# Patient Record
Sex: FEMALE | Race: WHITE | URBAN - METROPOLITAN AREA
[De-identification: names, ages, dates, MRNs, and addresses within clinical notes are randomized per-mention and may not be internally consistent; named-entity substitution may affect disease eponyms.]

---
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
## 2022-01-25 ENCOUNTER — TELEPHONE (OUTPATIENT)
Dept: OBGYN | Age: 22
End: 2022-01-25

## 2022-01-26 ENCOUNTER — HOSPITAL ENCOUNTER (OUTPATIENT)
Age: 22
Discharge: HOME | End: 2022-01-26
Attending: OBSTETRICS & GYNECOLOGY | Admitting: OBSTETRICS & GYNECOLOGY
Payer: MEDICAID

## 2022-01-26 VITALS
RESPIRATION RATE: 16 BRPM | SYSTOLIC BLOOD PRESSURE: 128 MMHG | OXYGEN SATURATION: 99 % | DIASTOLIC BLOOD PRESSURE: 84 MMHG | TEMPERATURE: 98.2 F | HEART RATE: 105 BPM

## 2022-01-26 LAB
-: ABNORMAL
ABO/RH: NORMAL
ABSOLUTE EOS #: <0.03 K/UL (ref 0–0.44)
ABSOLUTE IMMATURE GRANULOCYTE: 0.14 K/UL (ref 0–0.3)
ABSOLUTE LYMPH #: 1.58 K/UL (ref 1.1–3.7)
ABSOLUTE MONO #: 1.07 K/UL (ref 0.1–1.4)
ALBUMIN SERPL-MCNC: 3.5 G/DL (ref 3.5–5.2)
ALBUMIN/GLOBULIN RATIO: 1.1 (ref 1–2.5)
ALP BLD-CCNC: 106 U/L (ref 35–104)
ALT SERPL-CCNC: 13 U/L (ref 5–33)
AMORPHOUS: ABNORMAL
ANION GAP SERPL CALCULATED.3IONS-SCNC: 11 MMOL/L (ref 9–17)
ANTIBODY SCREEN: NEGATIVE
ARM BAND NUMBER: NORMAL
AST SERPL-CCNC: 19 U/L
BACTERIA: ABNORMAL
BASOPHILS # BLD: 0 % (ref 0–2)
BASOPHILS ABSOLUTE: <0.03 K/UL (ref 0–0.2)
BILIRUB SERPL-MCNC: 0.16 MG/DL (ref 0.3–1.2)
BILIRUBIN URINE: NEGATIVE
BUN BLDV-MCNC: 7 MG/DL (ref 6–20)
BUN/CREAT BLD: ABNORMAL (ref 9–20)
CALCIUM SERPL-MCNC: 8.7 MG/DL (ref 8.6–10.4)
CANDIDA SPECIES, DNA PROBE: NEGATIVE
CASTS UA: ABNORMAL /LPF (ref 0–8)
CHLORIDE BLD-SCNC: 105 MMOL/L (ref 98–107)
CO2: 19 MMOL/L (ref 20–31)
COLOR: YELLOW
CREAT SERPL-MCNC: 0.42 MG/DL (ref 0.5–0.9)
CREATININE URINE: 50.3 MG/DL (ref 28–217)
CRYSTALS, UA: ABNORMAL /HPF
DIFFERENTIAL TYPE: ABNORMAL
EOSINOPHILS RELATIVE PERCENT: 0 % (ref 1–4)
EPITHELIAL CELLS UA: ABNORMAL /HPF (ref 0–5)
EXPIRATION DATE: NORMAL
GARDNERELLA VAGINALIS, DNA PROBE: POSITIVE
GFR AFRICAN AMERICAN: >60 ML/MIN
GFR NON-AFRICAN AMERICAN: >60 ML/MIN
GFR SERPL CREATININE-BSD FRML MDRD: ABNORMAL ML/MIN/{1.73_M2}
GFR SERPL CREATININE-BSD FRML MDRD: ABNORMAL ML/MIN/{1.73_M2}
GLUCOSE BLD-MCNC: 90 MG/DL (ref 70–99)
GLUCOSE URINE: NEGATIVE
HCT VFR BLD CALC: 30.3 % (ref 36.3–47.1)
HEMOGLOBIN: 10.5 G/DL (ref 11.9–15.1)
HEPATITIS B SURFACE ANTIGEN: NONREACTIVE
HEPATITIS C ANTIBODY: NONREACTIVE
HIV AG/AB: NONREACTIVE
IMMATURE GRANULOCYTES: 2 %
KETONES, URINE: NEGATIVE
LEUKOCYTE ESTERASE, URINE: ABNORMAL
LYMPHOCYTES # BLD: 19 % (ref 25–45)
MCH RBC QN AUTO: 31 PG (ref 25.2–33.5)
MCHC RBC AUTO-ENTMCNC: 34.7 G/DL (ref 28.4–34.8)
MCV RBC AUTO: 89.4 FL (ref 82.6–102.9)
MONOCYTES # BLD: 13 % (ref 2–8)
MUCUS: ABNORMAL
NITRITE, URINE: NEGATIVE
NRBC AUTOMATED: 0 PER 100 WBC
OTHER OBSERVATIONS UA: ABNORMAL
PDW BLD-RTO: 12.3 % (ref 11.8–14.4)
PH UA: 7 (ref 5–8)
PLATELET # BLD: 200 K/UL (ref 138–453)
PLATELET ESTIMATE: ABNORMAL
PMV BLD AUTO: 10 FL (ref 8.1–13.5)
POTASSIUM SERPL-SCNC: 3.9 MMOL/L (ref 3.7–5.3)
PROTEIN UA: NEGATIVE
RBC # BLD: 3.39 M/UL (ref 3.95–5.11)
RBC # BLD: ABNORMAL 10*6/UL
RBC UA: ABNORMAL /HPF (ref 0–4)
RENAL EPITHELIAL, UA: ABNORMAL /HPF
RUBV IGG SER QL: 372.6 IU/ML
SEG NEUTROPHILS: 66 % (ref 34–64)
SEGMENTED NEUTROPHILS ABSOLUTE COUNT: 5.52 K/UL (ref 1.5–8.1)
SODIUM BLD-SCNC: 135 MMOL/L (ref 135–144)
SOURCE: ABNORMAL
SPECIFIC GRAVITY UA: 1.01 (ref 1–1.03)
T. PALLIDUM, IGG: NONREACTIVE
TOTAL PROTEIN, URINE: 10 MG/DL
TOTAL PROTEIN: 6.6 G/DL (ref 6.4–8.3)
TRICHOMONAS VAGINALIS DNA: NEGATIVE
TRICHOMONAS: ABNORMAL
TURBIDITY: ABNORMAL
URINE HGB: NEGATIVE
URINE TOTAL PROTEIN CREATININE RATIO: 0.2 (ref 0–0.2)
UROBILINOGEN, URINE: NORMAL
WBC # BLD: 8.4 K/UL (ref 4.5–13.5)
WBC # BLD: ABNORMAL 10*3/UL
WBC UA: ABNORMAL /HPF (ref 0–5)
YEAST: ABNORMAL

## 2022-01-26 PROCEDURE — 36415 COLL VENOUS BLD VENIPUNCTURE: CPT

## 2022-01-26 PROCEDURE — 6370000000 HC RX 637 (ALT 250 FOR IP): Performed by: STUDENT IN AN ORGANIZED HEALTH CARE EDUCATION/TRAINING PROGRAM

## 2022-01-26 PROCEDURE — 87480 CANDIDA DNA DIR PROBE: CPT

## 2022-01-26 PROCEDURE — 86780 TREPONEMA PALLIDUM: CPT

## 2022-01-26 PROCEDURE — 87591 N.GONORRHOEAE DNA AMP PROB: CPT

## 2022-01-26 PROCEDURE — 87510 GARDNER VAG DNA DIR PROBE: CPT

## 2022-01-26 PROCEDURE — 82570 ASSAY OF URINE CREATININE: CPT

## 2022-01-26 PROCEDURE — 87340 HEPATITIS B SURFACE AG IA: CPT

## 2022-01-26 PROCEDURE — 87491 CHLMYD TRACH DNA AMP PROBE: CPT

## 2022-01-26 PROCEDURE — 85025 COMPLETE CBC W/AUTO DIFF WBC: CPT

## 2022-01-26 PROCEDURE — 86850 RBC ANTIBODY SCREEN: CPT

## 2022-01-26 PROCEDURE — 81001 URINALYSIS AUTO W/SCOPE: CPT

## 2022-01-26 PROCEDURE — 87660 TRICHOMONAS VAGIN DIR PROBE: CPT

## 2022-01-26 PROCEDURE — 99213 OFFICE O/P EST LOW 20 MIN: CPT

## 2022-01-26 PROCEDURE — 87389 HIV-1 AG W/HIV-1&-2 AB AG IA: CPT

## 2022-01-26 PROCEDURE — 87086 URINE CULTURE/COLONY COUNT: CPT

## 2022-01-26 PROCEDURE — 86803 HEPATITIS C AB TEST: CPT

## 2022-01-26 PROCEDURE — 80053 COMPREHEN METABOLIC PANEL: CPT

## 2022-01-26 PROCEDURE — 86901 BLOOD TYPING SEROLOGIC RH(D): CPT

## 2022-01-26 PROCEDURE — 84156 ASSAY OF PROTEIN URINE: CPT

## 2022-01-26 PROCEDURE — 86900 BLOOD TYPING SEROLOGIC ABO: CPT

## 2022-01-26 PROCEDURE — 86762 RUBELLA ANTIBODY: CPT

## 2022-01-26 RX ORDER — ACETAMINOPHEN 500 MG
500 TABLET ORAL EVERY 6 HOURS PRN
COMMUNITY

## 2022-01-26 RX ORDER — ONDANSETRON 4 MG/1
4 TABLET, ORALLY DISINTEGRATING ORAL EVERY 8 HOURS PRN
Status: DISCONTINUED | OUTPATIENT
Start: 2022-01-26 | End: 2022-01-27 | Stop reason: HOSPADM

## 2022-01-26 RX ORDER — METRONIDAZOLE 500 MG/1
500 TABLET ORAL 2 TIMES DAILY
Qty: 14 TABLET | Refills: 0 | Status: SHIPPED | OUTPATIENT
Start: 2022-01-26 | End: 2022-02-02

## 2022-01-26 RX ORDER — ACETAMINOPHEN 500 MG
1000 TABLET ORAL EVERY 6 HOURS PRN
Status: DISCONTINUED | OUTPATIENT
Start: 2022-01-26 | End: 2022-01-26

## 2022-01-26 RX ORDER — METOCLOPRAMIDE 10 MG/1
10 TABLET ORAL ONCE
Status: COMPLETED | OUTPATIENT
Start: 2022-01-26 | End: 2022-01-26

## 2022-01-26 RX ORDER — DIPHENHYDRAMINE HCL 25 MG
25 TABLET ORAL ONCE
Status: COMPLETED | OUTPATIENT
Start: 2022-01-26 | End: 2022-01-26

## 2022-01-26 RX ORDER — ONDANSETRON 2 MG/ML
4 INJECTION INTRAMUSCULAR; INTRAVENOUS EVERY 6 HOURS PRN
Status: DISCONTINUED | OUTPATIENT
Start: 2022-01-26 | End: 2022-01-27 | Stop reason: HOSPADM

## 2022-01-26 RX ADMIN — DIPHENHYDRAMINE HCL 25 MG: 25 TABLET ORAL at 21:02

## 2022-01-26 RX ADMIN — METOCLOPRAMIDE 10 MG: 10 TABLET ORAL at 21:02

## 2022-01-27 ENCOUNTER — TELEPHONE (OUTPATIENT)
Dept: OBGYN | Age: 22
End: 2022-01-27

## 2022-01-27 PROBLEM — O09.93 HIGH-RISK PREGNANCY, THIRD TRIMESTER: Status: ACTIVE | Noted: 2022-01-27

## 2022-01-27 LAB
C TRACH DNA GENITAL QL NAA+PROBE: NEGATIVE
CULTURE: NORMAL
Lab: NORMAL
N. GONORRHOEAE DNA: NEGATIVE
SPECIMEN DESCRIPTION: NORMAL
SPECIMEN DESCRIPTION: NORMAL

## 2022-01-27 NOTE — TELEPHONE ENCOUNTER
Pt called. Will come into to office tomorrow to sign a release of records and she ws told we will call her for a new OB appt. Pt states she was seen in labor and delivery 1/26/22   No records in this chart. Labor and delivery confirmed she was seen in triage.
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
## 2022-01-27 NOTE — TELEPHONE ENCOUNTER
Pt called and will come into the clinic tomorrow to sign release of records. Pt told office will call her to get her scheduled for new Ob appt. Pt verb understanding of the plan of care.

## 2022-01-28 ENCOUNTER — TELEPHONE (OUTPATIENT)
Dept: OBGYN | Age: 22
End: 2022-01-28

## 2022-01-28 NOTE — TELEPHONE ENCOUNTER
TR    1/25/22 3:11 PM  Note  Pt just move to [place] a month ago from [place], and is looking to be seen before going into labor, please call pt back to advise               KAYLAN    1/25/22 3:11 PM  iVni Golden routed this conversation to Pari Limon            Me     DAVID    1/25/22 3:39 PM  Note  Patient said she was last seen by OB in [place] when she was 20 weeks pregnant. She said they will not answer her calls so is unable to obtain records. Per patient she had an anatomy scan and no blood work.   Please advise               DAVID    1/25/22 3:39 PM  You routed this conversation to Gorman Cabot, RN Edmond Hammer

## 2022-02-08 ENCOUNTER — HOSPITAL ENCOUNTER (OUTPATIENT)
Age: 22
Setting detail: SPECIMEN
Discharge: HOME | End: 2022-02-08

## 2022-02-08 ENCOUNTER — INITIAL PRENATAL (OUTPATIENT)
Dept: OBGYN | Age: 22
End: 2022-02-08
Payer: MEDICAID

## 2022-02-08 VITALS
SYSTOLIC BLOOD PRESSURE: 124 MMHG | BODY MASS INDEX: 28.05 KG/M2 | HEART RATE: 111 BPM | HEIGHT: 57 IN | DIASTOLIC BLOOD PRESSURE: 77 MMHG | WEIGHT: 130 LBS

## 2022-02-08 DIAGNOSIS — O09.33 LIMITED PRENATAL CARE IN THIRD TRIMESTER: ICD-10-CM

## 2022-02-08 DIAGNOSIS — Z3A.35 35 WEEKS GESTATION OF PREGNANCY: ICD-10-CM

## 2022-02-08 DIAGNOSIS — O09.93 HRP (HIGH RISK PREGNANCY), THIRD TRIMESTER: Primary | ICD-10-CM

## 2022-02-08 DIAGNOSIS — O09.93 HRP (HIGH RISK PREGNANCY), THIRD TRIMESTER: ICD-10-CM

## 2022-02-08 PROCEDURE — G8419 CALC BMI OUT NRM PARAM NOF/U: HCPCS | Performed by: OBSTETRICS & GYNECOLOGY

## 2022-02-08 PROCEDURE — 90471 IMMUNIZATION ADMIN: CPT

## 2022-02-08 PROCEDURE — 1036F TOBACCO NON-USER: CPT | Performed by: OBSTETRICS & GYNECOLOGY

## 2022-02-08 PROCEDURE — 99211 OFF/OP EST MAY X REQ PHY/QHP: CPT

## 2022-02-08 PROCEDURE — G8484 FLU IMMUNIZE NO ADMIN: HCPCS | Performed by: OBSTETRICS & GYNECOLOGY

## 2022-02-08 PROCEDURE — 90715 TDAP VACCINE 7 YRS/> IM: CPT | Performed by: OBSTETRICS & GYNECOLOGY

## 2022-02-08 PROCEDURE — G8427 DOCREV CUR MEDS BY ELIG CLIN: HCPCS | Performed by: OBSTETRICS & GYNECOLOGY

## 2022-02-08 PROCEDURE — 99213 OFFICE O/P EST LOW 20 MIN: CPT | Performed by: OBSTETRICS & GYNECOLOGY

## 2022-02-08 NOTE — PROGRESS NOTES
Jamison Hay is a 24 y.o. female 35w0d        OB History    Para Term  AB Living   2       1     SAB IAB Ectopic Molar Multiple Live Births   1                # Outcome Date GA Lbr Jalen/2nd Weight Sex Delivery Anes PTL Lv   2 Current            1 SAB                  Vitals  BP: 124/77  Weight: 130 lb (59 kg)  Pulse: 111  Patient Position: Sitting  Albumin: Trace  Glucose: Negative  Fundal Height (cm): 35 cm  Fetal Heart Rate: 145  Movement: Present      The patient was seen and evaluated. There was positive fetal movements. No contractions. Signs and symptoms of labor were reviewed. The S/S of Pre-Eclampsia were reviewed with the patient in detail. She is to report any of these if they occur. She currently denies any of these. Pt is c/o leaking of fluid for 2 days now. She was just diagnosed with BV at the hospital and is still on her flagyl - this could be the culprit, but we will do a rule out rupture workup today. Negative pooling  Negative Nitrazine  Negative ferning  Patient is NOT ruptured    The patient was instructed on fetal kick counts and was given a kick sheet to complete every 8 hours. She was instructed that the baby should move at a minimum of ten times within one hour after a meal. The patient was instructed to lay down on her left side twenty minutes after eating and count movements for up to one hour with a target value of ten movements. She was instructed to notify the office if she did not make that target after two attempts or if after any attempt there was less than four movements. The patient reports that the targets have been made Yes. No Patient Care Coordination Note on file. T-Dap Vaccine (27-36 weeks) Completed: pt to get tdap today. She refused the flu vaccine. Pt states she had all three covid vaccines in [place], but she does not have her card. Allergies:   Allergies as of 2022    (No Known Allergies)       Group Beta Strep collection was completed. Yes - collected today  GBS Results: pending  Sensitivities for clindamycin and erythromycin were ordered. No      The patient was counseled on the mandatory call ahead policy. She has been instructed to call the office at anytime prior to going into the hospital so the on-call physician may direct her to the appropriate facility for care. Exceptions were reviewed including but not limited to: Decreased fetal movement, vaginal Bleeding or hemorrhage, trauma, readily expectant delivery, or any instance where she feels 911 should be utilized. The patient was counseled on Labor & Delivery. Route of delivery and counseling on vaginal, operative vaginal, and  sections were completed with the risks of each to both the patient as well as her baby. The possibility of a blood transfusion was discussed as well. The patient was not opposed to receiving a transfusion if needed. The patient was counseled on types of analgesia during labor and is considering either Regional or IV medication the risks, benefits and alternatives were discussed. Assessment:  1. Jamison Hay is a 24 y.o. female  2.   3. 35w0d      Patient Active Problem List    Diagnosis Date Noted    Limited prenatal care in third trimester 2022    High-risk pregnancy, third trimester 2022        Diagnosis Orders   1. HRP (high risk pregnancy), third trimester  Culture, Strep B Screen, Vaginal/Rectal    Glucose tolerance, 1 hour    C.trachomatis N.gonorrhoeae DNA   2. 35 weeks gestation of pregnancy     3. Limited prenatal care in third trimester               Plan:  The patient will return to the office for her next visit in 1 weeks. See antepartum flow sheet.      GBS collected today    GC/chlam collected today    Pt still on abx for BV    LOF - negative workup for r/o rupture    Will try to refer pt to [facility]    Reviewed s/s of PTL, decreased FM and preeclampsia - encouraged pt to go to [facility]. Nancy

## 2022-02-08 NOTE — Clinical Note
Could you please refer this patient to [facility] and see if they can get her in soon? She is 35 weeks pregnant and has very limited prenatal care. Thanks. None

## 2022-02-09 DIAGNOSIS — O09.33 INSUFFICIENT PRENATAL CARE IN THIRD TRIMESTER: Primary | ICD-10-CM

## 2022-02-09 LAB
C TRACH DNA GENITAL QL NAA+PROBE: NEGATIVE
N. GONORRHOEAE DNA: NEGATIVE
SPECIMEN DESCRIPTION: NORMAL

## 2022-02-11 LAB
CULTURE: NORMAL
Lab: NORMAL
SPECIMEN DESCRIPTION: NORMAL

## 2022-02-16 ENCOUNTER — ROUTINE PRENATAL (OUTPATIENT)
Dept: OBGYN | Age: 22
End: 2022-02-16
Payer: MEDICAID

## 2022-02-16 VITALS
DIASTOLIC BLOOD PRESSURE: 74 MMHG | SYSTOLIC BLOOD PRESSURE: 121 MMHG | HEART RATE: 104 BPM | BODY MASS INDEX: 28.56 KG/M2 | WEIGHT: 132 LBS

## 2022-02-16 DIAGNOSIS — F41.9 ANXIETY: ICD-10-CM

## 2022-02-16 DIAGNOSIS — O09.90 HIGH RISK PREGNANCY, ANTEPARTUM: Primary | ICD-10-CM

## 2022-02-16 PROCEDURE — G8484 FLU IMMUNIZE NO ADMIN: HCPCS | Performed by: STUDENT IN AN ORGANIZED HEALTH CARE EDUCATION/TRAINING PROGRAM

## 2022-02-16 PROCEDURE — G8427 DOCREV CUR MEDS BY ELIG CLIN: HCPCS | Performed by: STUDENT IN AN ORGANIZED HEALTH CARE EDUCATION/TRAINING PROGRAM

## 2022-02-16 PROCEDURE — 99211 OFF/OP EST MAY X REQ PHY/QHP: CPT | Performed by: OBSTETRICS & GYNECOLOGY

## 2022-02-16 PROCEDURE — G8419 CALC BMI OUT NRM PARAM NOF/U: HCPCS | Performed by: STUDENT IN AN ORGANIZED HEALTH CARE EDUCATION/TRAINING PROGRAM

## 2022-02-16 PROCEDURE — 1036F TOBACCO NON-USER: CPT | Performed by: STUDENT IN AN ORGANIZED HEALTH CARE EDUCATION/TRAINING PROGRAM

## 2022-02-16 PROCEDURE — 99213 OFFICE O/P EST LOW 20 MIN: CPT | Performed by: STUDENT IN AN ORGANIZED HEALTH CARE EDUCATION/TRAINING PROGRAM

## 2022-02-16 NOTE — PROGRESS NOTES
Prenatal Visit    Jamison Hay is a 24 y.o. female  at 43w3d    The patient was seen and evaluated. She is complaining of nothing at this time. She reports positive fetal movements. She denies contractions, vaginal bleeding and leakage of fluid. Signs and symptoms of labor and pre-eclampsia were reviewed with the patient in detail. She is to report any of these if they occur. She currently denies any signs or symptoms of pre-clampsia including headache, vision changes, RUQ pain. The patient is Rh positive and Rhogam is not indicated in this pregnancy  The patient already received  the T-Dap Vaccine (27-36 weeks) this pregnancy. The patient declined the influenza vaccine this year. The patient already received the COVID-19 vaccine this year. The problem list reflects the active issues addressed during today's visit. Allergies:  No Known Allergies    Vitals:  BP: 121/74  Weight: 132 lb (59.9 kg)  Pulse: 104  Patient Position: Sitting  Albumin: Trace  Glucose: Negative  Fundal Height (cm): 36 cm  Fetal Heart Rate: 135  Movement: Present     Labs:  Group Beta Strep collection was negative. Assessment & Plan:  Jamison Hay is a 24 y.o. female  at 43w3d   - GBS testing was completed and negative   - Tdap vaccination: discussed recommendations for TDAP immunization, patient already received. - Influenza vaccination: declined   - COVID-19 vaccination: R/B/A discussed with increased risk of both maternal and fetal morbidity and mortality in unvaccinated pregnant patients who contract COVID-19- patient already received   - Warning signs reviewed and recommendations when to call or present to the hospital if she experiences signs or symptoms of  labor and pre-eclampsia were reviewed. - The patient was instructed on fetal kick counts.  She was instructed that the baby should move at a minimum of ten times within one hour after a meal. The patient was instructed to lay down on her left side twenty minutes after eating and count movements for up to one hour with a target value of ten movements. She was instructed to notify the office if she did not make that target after two attempts or if after any attempt there was less than four movements. - Route of delivery and counseling on vaginal, operative vaginal, and  sections were completed with the risks of each to both the patient as well as her baby. The possibility of a blood transfusion was discussed as well. The patient was not opposed to receiving a transfusion if needed. - The patient was counseled on types of analgesia during labor and is considering epidural.  - The patient was counseled on the need to choose her pediatrician for her baby. Patient Active Problem List    Diagnosis Date Noted    Anxiety 2022    Limited prenatal care in third trimester 2022    High-risk pregnancy, third trimester 2022     Return in about 1 week (around 2022) for YANGCalderon Gavin DO  Ob/Gyn Resident  [facility], [facility]  2022, 10:18 PM

## 2022-02-22 ENCOUNTER — ROUTINE PRENATAL (OUTPATIENT)
Dept: PERINATAL CARE | Age: 22
End: 2022-02-22
Payer: MEDICAID

## 2022-02-22 ENCOUNTER — ROUTINE PRENATAL (OUTPATIENT)
Dept: OBGYN | Age: 22
End: 2022-02-22
Payer: MEDICAID

## 2022-02-22 VITALS
BODY MASS INDEX: 28.78 KG/M2 | SYSTOLIC BLOOD PRESSURE: 115 MMHG | HEART RATE: 99 BPM | DIASTOLIC BLOOD PRESSURE: 75 MMHG | WEIGHT: 133 LBS

## 2022-02-22 VITALS
HEART RATE: 128 BPM | SYSTOLIC BLOOD PRESSURE: 109 MMHG | HEIGHT: 57 IN | DIASTOLIC BLOOD PRESSURE: 72 MMHG | WEIGHT: 134 LBS | BODY MASS INDEX: 28.91 KG/M2 | TEMPERATURE: 97.3 F

## 2022-02-22 DIAGNOSIS — O35.2XX0 HEREDITARY FAMILIAL DISEASE AFFECTING MANAGEMENT OF MOTHER AND POSSIBLY AFFECTING FETUS, ANTEPARTUM, SINGLE OR UNSPECIFIED FETUS: Primary | ICD-10-CM

## 2022-02-22 DIAGNOSIS — Z3A.37 37 WEEKS GESTATION OF PREGNANCY: Primary | ICD-10-CM

## 2022-02-22 DIAGNOSIS — Z3A.37 37 WEEKS GESTATION OF PREGNANCY: ICD-10-CM

## 2022-02-22 DIAGNOSIS — O09.33 INSUFFICIENT PRENATAL CARE IN THIRD TRIMESTER: ICD-10-CM

## 2022-02-22 DIAGNOSIS — O09.33 LIMITED PRENATAL CARE IN THIRD TRIMESTER: ICD-10-CM

## 2022-02-22 LAB
ABDOMINAL CIRCUMFERENCE: NORMAL
BIPARIETAL DIAMETER: NORMAL
ESTIMATED FETAL WEIGHT: NORMAL
FEMORAL DIAMETER: NORMAL
HC/AC: NORMAL
HEAD CIRCUMFERENCE: NORMAL

## 2022-02-22 PROCEDURE — 76811 OB US DETAILED SNGL FETUS: CPT | Performed by: OBSTETRICS & GYNECOLOGY

## 2022-02-22 PROCEDURE — 76819 FETAL BIOPHYS PROFIL W/O NST: CPT | Performed by: OBSTETRICS & GYNECOLOGY

## 2022-02-22 PROCEDURE — G8419 CALC BMI OUT NRM PARAM NOF/U: HCPCS | Performed by: STUDENT IN AN ORGANIZED HEALTH CARE EDUCATION/TRAINING PROGRAM

## 2022-02-22 PROCEDURE — 1036F TOBACCO NON-USER: CPT | Performed by: STUDENT IN AN ORGANIZED HEALTH CARE EDUCATION/TRAINING PROGRAM

## 2022-02-22 PROCEDURE — G8427 DOCREV CUR MEDS BY ELIG CLIN: HCPCS | Performed by: STUDENT IN AN ORGANIZED HEALTH CARE EDUCATION/TRAINING PROGRAM

## 2022-02-22 PROCEDURE — G8484 FLU IMMUNIZE NO ADMIN: HCPCS | Performed by: STUDENT IN AN ORGANIZED HEALTH CARE EDUCATION/TRAINING PROGRAM

## 2022-02-22 PROCEDURE — 99999 PR OFFICE/OUTPT VISIT,PROCEDURE ONLY: CPT | Performed by: OBSTETRICS & GYNECOLOGY

## 2022-02-22 PROCEDURE — 99213 OFFICE O/P EST LOW 20 MIN: CPT | Performed by: STUDENT IN AN ORGANIZED HEALTH CARE EDUCATION/TRAINING PROGRAM

## 2022-02-22 ASSESSMENT — PAIN SCALES - GENERAL: PAINLEVEL_OUTOF10: 6

## 2022-02-22 NOTE — PROGRESS NOTES
Prenatal Visit    Don Rivera is a 24 y.o. female  at 37w0d    The patient was seen and evaluated. The patient complains of vaginal pain \"like she is trying to come out. \" There was positive fetal movements. She denies contractions, vaginal bleeding and leakage of fluid. Signs and symptoms of labor were reviewed. The S/S of Pre-Eclampsia were reviewed with the patient in detail. She is to report any of these if they occur. She currently denies any of these. Allergies:  Patient has no known allergies. Vitals:  BP: 115/75  Weight: 133 lb (60.3 kg)  Pulse: 99  Patient Position: Sitting  Albumin: 1+  Glucose: Negative     Physical Exam:  SVE: 1 cm dilated, 50 effaced, -3 station  Chaperone for Intimate Exam: Chaperone was present for entire exam, Chaperone Name: Veronica    Labs:  Group Beta Strep collection was done. Sensitivities for clindamycin and erythromycin were not ordered. The patient was found to be GBS: negative    Assessment & Plan:  Don Rivera is a 24 y.o. female  at 37w0d   - GBS RV culture: completed, sensitivities for clindamycin and erythromycin were not ordered/indicated    - Tdap vaccination: discussed recommendations for TDAP immunization, patient already received 22. - Influenza vaccination: declined   - Rhogam: not indicated    -  testing indication: not indicated   - Patient saw [facility] today for US. No report scanned at this time. Patient reports was normal.   - Risk Reducing IOL scheduled for 2200 on 3/7/2022 with induction to start after midnight. Patient is aware it may be rescheduled depending on room availability. - Warning signs of  labor, pre-eclampsia, decreased fetal movement and recommendations when to call or present to the hospital reviewed   - Route of delivery and counseling on vaginal, operative vaginal, and  sections were completed with the risks of each to both the patient as well as her baby.  The possibility of a blood transfusion was discussed as well. The patient was not opposed to receiving a transfusion if needed. - The patient was counseled on types of analgesia during labor and is considering epidural.  - The patient was counseled on the need to choose her pediatrician for her baby. - The patient was counseled on postpartum plans for contraceptives and she does not want any at this time. Patient Active Problem List    Diagnosis Date Noted    Anxiety 02/16/2022    Limited prenatal care in third trimester 02/08/2022    High-risk pregnancy, third trimester 01/27/2022     Return in about 1 week (around 3/1/2022) for YANG. Katie Clay DO  Ob/Gyn Resident  [facility] [id number]  2/22/2022, 5:14 PM         Attending Physician Statement  I have discussed the care of Donna Aly, including pertinent history and exam findings,  with the resident. I have reviewed the key elements of all parts of the encounter with the resident. I agree with the assessment, plan and orders as documented by the resident.   (GE Modifier)      Benjamín Cedeno DO

## 2022-02-24 ENCOUNTER — TELEPHONE (OUTPATIENT)
Dept: OBGYN | Age: 22
End: 2022-02-24

## 2022-02-24 ENCOUNTER — HOSPITAL ENCOUNTER (OUTPATIENT)
Age: 22
Discharge: HOME | End: 2022-02-24
Attending: OBSTETRICS & GYNECOLOGY | Admitting: OBSTETRICS & GYNECOLOGY
Payer: MEDICAID

## 2022-02-24 VITALS
DIASTOLIC BLOOD PRESSURE: 79 MMHG | RESPIRATION RATE: 18 BRPM | TEMPERATURE: 97.3 F | HEART RATE: 91 BPM | SYSTOLIC BLOOD PRESSURE: 115 MMHG

## 2022-02-24 PROBLEM — R80.9 PROTEINURIA: Status: ACTIVE | Noted: 2022-02-24

## 2022-02-24 PROBLEM — Z3A.37 37 WEEKS GESTATION OF PREGNANCY: Status: ACTIVE | Noted: 2022-02-24

## 2022-02-24 LAB
-: ABNORMAL
ABSOLUTE EOS #: 0.03 K/UL (ref 0–0.44)
ABSOLUTE IMMATURE GRANULOCYTE: 0.07 K/UL (ref 0–0.3)
ABSOLUTE LYMPH #: 1.51 K/UL (ref 1.1–3.7)
ABSOLUTE MONO #: 0.96 K/UL (ref 0.1–1.4)
ALBUMIN SERPL-MCNC: 3.6 G/DL (ref 3.5–5.2)
ALBUMIN/GLOBULIN RATIO: 1.1 (ref 1–2.5)
ALP BLD-CCNC: 150 U/L (ref 35–104)
ALT SERPL-CCNC: 12 U/L (ref 5–33)
ANION GAP SERPL CALCULATED.3IONS-SCNC: 13 MMOL/L (ref 9–17)
AST SERPL-CCNC: 23 U/L
BACTERIA: ABNORMAL
BASOPHILS # BLD: 0 % (ref 0–2)
BASOPHILS ABSOLUTE: <0.03 K/UL (ref 0–0.2)
BILIRUB SERPL-MCNC: 0.16 MG/DL (ref 0.3–1.2)
BILIRUBIN URINE: NEGATIVE
BUN BLDV-MCNC: 8 MG/DL (ref 6–20)
CALCIUM SERPL-MCNC: 8.8 MG/DL (ref 8.6–10.4)
CANDIDA SPECIES, DNA PROBE: NEGATIVE
CASTS UA: ABNORMAL /LPF (ref 0–2)
CHLORIDE BLD-SCNC: 101 MMOL/L (ref 98–107)
CO2: 20 MMOL/L (ref 20–31)
COLOR: YELLOW
CREAT SERPL-MCNC: 0.39 MG/DL (ref 0.5–0.9)
CREATININE URINE: 203.2 MG/DL (ref 28–217)
EOSINOPHILS RELATIVE PERCENT: 1 % (ref 1–4)
EPITHELIAL CELLS UA: ABNORMAL /HPF (ref 0–5)
GARDNERELLA VAGINALIS, DNA PROBE: NEGATIVE
GFR AFRICAN AMERICAN: >60 ML/MIN
GFR NON-AFRICAN AMERICAN: >60 ML/MIN
GFR SERPL CREATININE-BSD FRML MDRD: ABNORMAL ML/MIN/{1.73_M2}
GLUCOSE BLD-MCNC: 90 MG/DL (ref 70–99)
GLUCOSE URINE: NEGATIVE
HCT VFR BLD CALC: 32.8 % (ref 36.3–47.1)
HEMOGLOBIN: 11.1 G/DL (ref 11.9–15.1)
IMMATURE GRANULOCYTES: 1 %
KETONES, URINE: ABNORMAL
LEUKOCYTE ESTERASE, URINE: ABNORMAL
LYMPHOCYTES # BLD: 24 % (ref 25–45)
MCH RBC QN AUTO: 29.4 PG (ref 25.2–33.5)
MCHC RBC AUTO-ENTMCNC: 33.8 G/DL (ref 28.4–34.8)
MCV RBC AUTO: 86.8 FL (ref 82.6–102.9)
MONOCYTES # BLD: 15 % (ref 2–8)
NITRITE, URINE: NEGATIVE
NRBC AUTOMATED: 0 PER 100 WBC
PDW BLD-RTO: 12.5 % (ref 11.8–14.4)
PH UA: 6.5 (ref 5–8)
PLATELET # BLD: 233 K/UL (ref 138–453)
PMV BLD AUTO: 10.4 FL (ref 8.1–13.5)
POTASSIUM SERPL-SCNC: 3.9 MMOL/L (ref 3.7–5.3)
PROTEIN UA: ABNORMAL
RBC # BLD: 3.78 M/UL (ref 3.95–5.11)
RBC UA: ABNORMAL /HPF (ref 0–2)
SEG NEUTROPHILS: 59 % (ref 34–64)
SEGMENTED NEUTROPHILS ABSOLUTE COUNT: 3.77 K/UL (ref 1.5–8.1)
SODIUM BLD-SCNC: 134 MMOL/L (ref 135–144)
SOURCE: NORMAL
SPECIFIC GRAVITY UA: 1.03 (ref 1–1.03)
TOTAL PROTEIN, URINE: 65 MG/DL
TOTAL PROTEIN: 6.8 G/DL (ref 6.4–8.3)
TRICHOMONAS VAGINALIS DNA: NEGATIVE
TURBIDITY: ABNORMAL
URINE HGB: NEGATIVE
URINE TOTAL PROTEIN CREATININE RATIO: 0.32 (ref 0–0.2)
UROBILINOGEN, URINE: NORMAL
WBC # BLD: 6.4 K/UL (ref 4.5–13.5)
WBC UA: ABNORMAL /HPF (ref 0–5)

## 2022-02-24 PROCEDURE — 87491 CHLMYD TRACH DNA AMP PROBE: CPT

## 2022-02-24 PROCEDURE — 99213 OFFICE O/P EST LOW 20 MIN: CPT

## 2022-02-24 PROCEDURE — 87660 TRICHOMONAS VAGIN DIR PROBE: CPT

## 2022-02-24 PROCEDURE — 85025 COMPLETE CBC W/AUTO DIFF WBC: CPT

## 2022-02-24 PROCEDURE — 6370000000 HC RX 637 (ALT 250 FOR IP): Performed by: STUDENT IN AN ORGANIZED HEALTH CARE EDUCATION/TRAINING PROGRAM

## 2022-02-24 PROCEDURE — 87510 GARDNER VAG DNA DIR PROBE: CPT

## 2022-02-24 PROCEDURE — 81001 URINALYSIS AUTO W/SCOPE: CPT

## 2022-02-24 PROCEDURE — 87591 N.GONORRHOEAE DNA AMP PROB: CPT

## 2022-02-24 PROCEDURE — 84156 ASSAY OF PROTEIN URINE: CPT

## 2022-02-24 PROCEDURE — 87086 URINE CULTURE/COLONY COUNT: CPT

## 2022-02-24 PROCEDURE — 82570 ASSAY OF URINE CREATININE: CPT

## 2022-02-24 PROCEDURE — 87480 CANDIDA DNA DIR PROBE: CPT

## 2022-02-24 PROCEDURE — 80053 COMPREHEN METABOLIC PANEL: CPT

## 2022-02-24 RX ORDER — PROMETHAZINE HYDROCHLORIDE 12.5 MG/1
12.5 TABLET ORAL EVERY 6 HOURS PRN
Status: DISCONTINUED | OUTPATIENT
Start: 2022-02-24 | End: 2022-02-24 | Stop reason: HOSPADM

## 2022-02-24 RX ORDER — CYCLOBENZAPRINE HCL 10 MG
10 TABLET ORAL ONCE
Status: COMPLETED | OUTPATIENT
Start: 2022-02-24 | End: 2022-02-24

## 2022-02-24 RX ORDER — ACETAMINOPHEN 500 MG
1000 TABLET ORAL EVERY 6 HOURS PRN
Status: DISCONTINUED | OUTPATIENT
Start: 2022-02-24 | End: 2022-02-24 | Stop reason: HOSPADM

## 2022-02-24 RX ORDER — ONDANSETRON 2 MG/ML
4 INJECTION INTRAMUSCULAR; INTRAVENOUS EVERY 6 HOURS PRN
Status: DISCONTINUED | OUTPATIENT
Start: 2022-02-24 | End: 2022-02-24 | Stop reason: HOSPADM

## 2022-02-24 RX ADMIN — CYCLOBENZAPRINE 10 MG: 10 TABLET, FILM COATED ORAL at 20:12

## 2022-02-24 RX ADMIN — ACETAMINOPHEN 1000 MG: 500 TABLET ORAL at 21:04

## 2022-02-24 NOTE — TELEPHONE ENCOUNTER
Joanna Rosas called today stating since her last visit when her cervix was checked she has been having contractions and watery discharge and losing her mucus plug. Advised to go to [facility] for evaluation due to loss of fluid.

## 2022-02-25 LAB
CULTURE: NORMAL
SPECIMEN DESCRIPTION: NORMAL

## 2022-02-25 NOTE — PROGRESS NOTES
Ob/Gyn Resident Progress Note:    Patient seen and reevaluated. She states she still has diffuse abdominal discomfort but that it is not severe. Patient reports minimal improvement with Flexeril. Patient's labs within normal limits aside from mild proteinuria with PC ratio of 0.32. Patient's blood pressure remains normotensive. All other preeclampsia labs are within normal limits. BPP 8/8. Fetal tracing reassuring and reactive. Discussed with patient that at this time there are no concerns given reassuring fetal status and unremarkable labs. UA with low suspicion for UTI, possibly contaminated specimen. Will await urine culture. Patient states she is comfortable going home at this time. Patient will try a warm bath for her abdominal discomfort. Patient instructed to call or return to the hospital for worsening symptoms, vaginal bleeding, leakage of fluid, or signs and symptoms of preeclampsia. Patient agreement with this plan. Discussed with attending and senior resident.     Lucero Kahn DO  OB/GYN Resident, PGY2  [facility]  2/24/2022 8:37 PM

## 2022-02-25 NOTE — FLOWSHEET NOTE
Pt to L&D accompanied by get with complaint of ctx that started on tuesday. Pt states her right upper abdomen has been causing her pain for the last couple of days with no relief from tylenol. Pt also states her legs are swollen. Pt states she lost her mucous plug on Tuesday as well. Denies vaginal bleeding. Pt states she hasn't felt baby move \"since maybe Tuesday or Wednesday. The only thing I can feel right now is her hiccups. \" Will update Dr Radha Kang of pt arrival.

## 2022-02-25 NOTE — H&P
OBSTETRICAL HISTORY [facility]    Date: 2022       Time: 8:36 PM   Patient Name: Valencia Ovalle     Patient : 2000  Room/Bed: [place] [address]    Admission Date/Time: 2022  7:15 PM      CC: Contractions and leakage of fluid     HPI: Valencia Ovalle is a 24 y.o.  at 37w2d who presents c/o cramping and vaginal discharge beginning today. She also noted some intermittent headaches and diffuse abdominal pain. She states she has taken tylenol for the pain to little relief. Patient reports recent intercourse. Patient requests to have her membranes stripped. Patient denies any fever, chills, N/V, chest pain, shortness of breath. Patient denies any urinary complaints. The patient reports fetal movement is present, complains of contractions, complains of loss of fluid, denies vaginal bleeding. DATING:  LMP: No LMP recorded. Patient is pregnant.   Estimated Date of Delivery: 3/15/22   Based on: early ultrasound, at 11 6/7 weeks GA    PREGNANCY RISK FACTORS:  Patient Active Problem List   Diagnosis    High-risk pregnancy, third trimester    Limited prenatal care in third trimester    Anxiety    37 weeks gestation of pregnancy    Proteinuria        Steroids Given In This Pregnancy:  no     REVIEW OF SYSTEMS:   Constitutional: negative fever, negative chills  HEENT: negative visual disturbances, positive intermittent headache  Respiratory: negative dyspnea, negative cough  Cardiovascular: negative chest pain,  negative palpitations  Gastrointestinal: positive abdominal cramping and diffuse abdominal discomfort, negative N/V, negative diarrhea, negative constipation  Genitourinary: negative dysuria, negative vaginal bleeding, positive vaginal discharge  Dermatological: negative rash  Hematologic: negative bruising  Immunologic/Lymphatic: negative recent illness, negative recent sick contact  Musculoskeletal: negative back pain, negative myalgias, negative arthralgias  Neurological:  negative dizziness, negative weakness  Behavior/Psych: negative depression, negative anxiety    OBSTETRICAL HISTORY:   OB History    Para Term  AB Living   2 0 0 0 1 0   SAB IAB Ectopic Molar Multiple Live Births   1 0 0 0 0 0      # Outcome Date GA Lbr Jalen/2nd Weight Sex Delivery Anes PTL Lv   2 Current            1 SAB                PAST MEDICAL HISTORY:   has no past medical history on file. PAST SURGICAL HISTORY:   has no past surgical history on file. ALLERGIES:  has No Known Allergies. MEDICATIONS:  Prior to Admission medications    Medication Sig Start Date End Date Taking? Authorizing Provider   acetaminophen (TYLENOL) 500 MG tablet Take 500 mg by mouth every 6 hours as needed for Pain   Yes Historical Provider, MD       FAMILY HISTORY:  family history includes Breast Cancer in her maternal aunt. SOCIAL HISTORY:   reports that she quit smoking about 4 weeks ago. Her smoking use included cigarettes. She smoked 0.25 packs per day. She has never used smokeless tobacco. She reports previous alcohol use. She reports previous drug use.     VITALS:  Vitals:    22 1924 22 2030   BP: 126/77 115/79   Pulse: 95 91   Resp: 18 18   Temp: 97.3 °F (36.3 °C)    TempSrc: Oral          PHYSICAL EXAM:  Fetal Heart Monitor:  Baseline Heart Rate 155, moderate variability, present accelerations, absent decelerations  Griffith: uterine irritability    General appearance:  no apparent distress, alert, and cooperative  HEENT: head atraumatic, normocephalic, moist mucous membranes, trachea midline  Neurologic:  alert, oriented, normal speech, no focal findings or movement disorder noted  Lungs:  No increased work of breathing, good air exchange, clear to auscultation bilaterally, no crackles or wheezing  Heart:  regular rate and rhythm and no murmur    Abdomen:  soft, gravid, non-tender, no rebound, guarding, or rigidity, and no RUQ or epigastric tenderness  Extremities: no calf tenderness, non edematous, DTR's: +2/4 bilateral lower extremities   Musculoskeletal: Gross strength equal and intact throughout, no gross abnormalities, range of motion normal in hips, knees, shoulders and spine, CVA tenderness: none  Psychiatric: Mood appropriate, normal affect   Rectal Exam: not indicated  Sterile Speculum Exam:   Urethral meatus: normal appearing   Vulva: Normal hair distribution, normal appearing vulva, no masses, tenderness or lesions, normal clitoris   Vagina: Normal appearing vaginal mucosa without lesions, moderate white vaginal discharge noted in the posterior vault, no lacerations, no blood noted in posterior vaginal vault   Cervix: Normal appearing cervix without lesions, external os difficult to visualize 2/2 vaginal discharge, no lacerations or abnormal lesions visualized  Sterile Vaginal Exam:  Cervix: No cervical motion tenderness   Uterus: Is gravid, Normal size, shape, consistency and non-tender   Adnexa: Non-tender, no palpable masses  Cervix: 1 cm dilated, thick, high    DATA:  Membranes Ruptured: No  Valsalva/Pooling: absent  Vaginal Bleeding: absent    LIMITED BEDSIDE US:  Position: Cephalic  Placental Location: anterior  Fetal Heart Tones: Present  Fetal Movement: Present  Amniotic Fluid Index/Volume: >2x2 cm MVP    BPP 8/8     PRENATAL LAB RESULTS:   Blood Type/Rh: A pos  Antibody Screen: negative  Hemoglobin, Hematocrit, Platelets: 00.7 / 39.0 / 200  Rubella: immune  T.  Pallidum, IgG: non-reactive   Hepatitis B Surface Antigen: non-reactive   Hepatitis C antibody: non-reactive   HIV: non-reactive   Sickle Cell Screen: not available  Gonorrhea: negative  Chlamydia: negative  Urine culture: negative, date: 1/26/22    1 hour Glucose Tolerance Test: ordered, not completed    Group B Strep: negative  Cystic Fibrosis Screen: not available  First Trimester Screen: not available  MSAFP/Multiple Markers: not available  Non-Invasive Prenatal Testing: not available  Anatomy US: normal female anatomy, 3vc, normal cord insertion, anterior placenta    ASSESSMENT & PLAN:  Isatu Yost is a 24 y.o. female  at 42w2d    - GBS negative / Rh positive / R immune   - No indication for GBS prophylaxis    Leakage of fluid/Contractions   - Patient complaining of leakage of fluid and contractions. - cEFM/TOCO - Cat 1 tracing, uterine irritability   - SSE: No external lesions or erythema, normal appearing vaginal mucosa that is pink and moist, no blood noted in the vaginal canal or at the cervical os, moderate amount of white physiologic discharge in vault   - SVE: 1cm, thick, high   - UA collected   - GC/C collected   - Vaginitis collected   - Negative valsalva, negative pooling   - Low suspicion for labor at this time given exam. And negative TOCO. Will await lab results and repeat SVE if needed. RUQ pain/HA   - Patient reports RUQ pain and diffuse abdominal discomfort, on exam   - Patient reports intermittent headaches but denies current headache   - Denies any other s/s preeclampsia   - BP normotensive   - Preeclampsia labs wnl, P/C 0.32    Limited PNC   - Moved from [place] at 20 weeks GA   - Patient received prenatal care in [place], however no records in patient chart, she reports normal anatomy [address] in [place], [place] 22 wnl    FHx CHD   - FOB   - Anatomy scan wnl but no fetal echo due to late presentation   - [facility] recommended peds cardio and early  echo    FHx DM   - 1hr GTT ordered, not completed    BMI 28.7    Patient Active Problem List    Diagnosis Date Noted    37 weeks gestation of pregnancy 2022    Proteinuria 2022     0.32 P/C ratio 22  BP normotensive, PreE labs wnl x1      Anxiety 2022    Limited prenatal care in third trimester 2022     Moved from [place] at 20 weeks (did receive prenatal care there). Dated off 11 week ultrasound.   Anatomy scan completed 22 (no report at this time)      High-risk pregnancy, third trimester 01/27/2022       Plan discussed with Dr. Hany Corrales, who is agreeable. Steroids given this admission: No    Risks, benefits, alternatives and possible complications have been discussed in detail with the patient. Admission, and post admission procedures and expectations were discussed in detail. All questions were answered.     Attending's Name: Dr. Sandra Ramos, [place]  Ob/Gyn Resident  2/24/2022, 8:36 PM

## 2022-03-02 ENCOUNTER — ROUTINE PRENATAL (OUTPATIENT)
Dept: OBGYN | Age: 22
End: 2022-03-02
Payer: MEDICAID

## 2022-03-02 VITALS
BODY MASS INDEX: 29.65 KG/M2 | SYSTOLIC BLOOD PRESSURE: 127 MMHG | DIASTOLIC BLOOD PRESSURE: 80 MMHG | HEART RATE: 93 BPM | WEIGHT: 137 LBS

## 2022-03-02 DIAGNOSIS — Z3A.38 38 WEEKS GESTATION OF PREGNANCY: ICD-10-CM

## 2022-03-02 DIAGNOSIS — O09.90 HIGH RISK PREGNANCY, ANTEPARTUM: Primary | ICD-10-CM

## 2022-03-02 PROCEDURE — G8427 DOCREV CUR MEDS BY ELIG CLIN: HCPCS | Performed by: STUDENT IN AN ORGANIZED HEALTH CARE EDUCATION/TRAINING PROGRAM

## 2022-03-02 PROCEDURE — 1036F TOBACCO NON-USER: CPT | Performed by: STUDENT IN AN ORGANIZED HEALTH CARE EDUCATION/TRAINING PROGRAM

## 2022-03-02 PROCEDURE — G8419 CALC BMI OUT NRM PARAM NOF/U: HCPCS | Performed by: STUDENT IN AN ORGANIZED HEALTH CARE EDUCATION/TRAINING PROGRAM

## 2022-03-02 PROCEDURE — G8484 FLU IMMUNIZE NO ADMIN: HCPCS | Performed by: STUDENT IN AN ORGANIZED HEALTH CARE EDUCATION/TRAINING PROGRAM

## 2022-03-02 PROCEDURE — 99213 OFFICE O/P EST LOW 20 MIN: CPT | Performed by: STUDENT IN AN ORGANIZED HEALTH CARE EDUCATION/TRAINING PROGRAM

## 2022-03-02 NOTE — PROGRESS NOTES
Prenatal Visit    Isatu Yost is a 24 y.o. female  at 43w4d    The patient was seen and evaluated. The patient complains of nothing at this time. There was positive fetal movements. She denies contractions, vaginal bleeding and leakage of fluid. Signs and symptoms of labor were reviewed. The S/S of Pre-Eclampsia were reviewed with the patient in detail. She is to report any of these if they occur. She currently denies any signs or symptoms of pre-eclampsia which include headache, vision changes, RUQ pain. The patient already received the T-Dap Vaccine (27-36 weeks) this pregnancy. The patient is Rh positive and Rhogam is not indicated in this pregnancy  The patient declined the influenza vaccine this year. The patient declined the COVID-19 vaccine this year. Allergies:  Patient has no known allergies. Vitals and physical exam:  Vitals:    22 1617   BP: 127/80   Site: Left Upper Arm   Position: Sitting   Cuff Size: Medium Adult   Pulse: 93   Weight: 137 lb (62.1 kg)       BP: 127/80  Weight: 137 lb (62.1 kg)  Pulse: 93  Patient Position: Sitting  Albumin: Negative  Glucose: Negative  Fundal Height (cm): 38 cm  Fetal Heart Rate: 145  Dilation (cm): 1  Effacement: 60  Station: -1       Labs:  Group Beta Strep RV culture: negative. Assessment & Plan:  Isatu Yost is a 24 y.o. female  at 38w1d   - GBS RV culture: negative   - Tdap vaccination: discussed recommendations for TDAP immunization, patient already received.    - Influenza vaccination: declined   - Rhogam: not indicated   - COVID-19 vaccination: R/B/A discussed with increased risk of both maternal and fetal morbidity and mortality in unvaccinated pregnant patients who contract COVID-19- patient declined today   - Warning signs of  labor, pre-eclampsia, decreased fetal movement and recommendations when to call or present to the hospital reviewed   - Route of delivery and counseling on vaginal, operative vaginal, and  sections were completed with the risks of each to both the patient as well as her baby. The possibility of a blood transfusion was discussed as well. The patient was not opposed to receiving a transfusion if needed. - The patient was counseled on types of analgesia during labor and is considering epidural.  - The patient was counseled on the need to choose her pediatrician for her baby. - The patient was counseled on postpartum plans for dysmenorrhea , she is considering IUD. Patient Active Problem List    Diagnosis Date Noted    37 weeks gestation of pregnancy 2022    Proteinuria 2022     0.32 P/C ratio 22  BP normotensive, PreE labs wnl x1      Anxiety 2022    Limited prenatal care in third trimester 2022     Moved from [place] at 20 weeks (did receive prenatal care there). Dated off 11 week ultrasound. Anatomy scan completed 22 (no report at this time)      High-risk pregnancy, third trimester 2022     Return in about 1 week (around 3/9/2022) for INDUCTION.     Karon Lin DO  Ob/Gyn Resident  [facility], [facility]  3/3/2022, 3:23 PM

## 2022-03-04 ENCOUNTER — TELEPHONE (OUTPATIENT)
Dept: OBGYN | Age: 22
End: 2022-03-04

## 2022-03-04 ENCOUNTER — HOSPITAL ENCOUNTER (OUTPATIENT)
Age: 22
Discharge: HOME | DRG: 560 | End: 2022-03-04
Attending: OBSTETRICS & GYNECOLOGY | Admitting: OBSTETRICS & GYNECOLOGY
Payer: MEDICAID

## 2022-03-04 VITALS
RESPIRATION RATE: 20 BRPM | HEART RATE: 101 BPM | SYSTOLIC BLOOD PRESSURE: 134 MMHG | DIASTOLIC BLOOD PRESSURE: 78 MMHG | TEMPERATURE: 98.6 F

## 2022-03-04 PROBLEM — O47.9 UTERINE CONTRACTIONS AT GREATER THAN 20 WEEKS OF GESTATION: Status: ACTIVE | Noted: 2022-03-04

## 2022-03-04 PROBLEM — Z3A.38 38 WEEKS GESTATION OF PREGNANCY: Status: ACTIVE | Noted: 2022-03-04

## 2022-03-04 PROCEDURE — 99236 HOSP IP/OBS SAME DATE HI 85: CPT | Performed by: OBSTETRICS & GYNECOLOGY

## 2022-03-04 PROCEDURE — 99213 OFFICE O/P EST LOW 20 MIN: CPT

## 2022-03-04 RX ORDER — ACETAMINOPHEN 500 MG
1000 TABLET ORAL EVERY 6 HOURS PRN
Status: DISCONTINUED | OUTPATIENT
Start: 2022-03-04 | End: 2022-03-04 | Stop reason: HOSPADM

## 2022-03-04 NOTE — TELEPHONE ENCOUNTER
Ro Brandt called today stating that she is having severe pain on her left side and Tylenol is not helping. Spoke with Dr Lindsay Viera and she advised that Ro Brandt go to [facility] for evaluation.

## 2022-03-04 NOTE — H&P
[facility]    Date: 3/4/2022       Time: 2:12 PM   Patient Name: Angélica Black     Patient : 2000  Room/Bed: [place] [address]    Admission Date/Time: 3/4/2022 11:00 AM      CC: contractions     HPI: Angélica Black is a 24 y.o. Roselia Credit at 38w3d who presents with contractions for the past several hours. She called the nurse line and was instructed to come in for evaluation. The patient reports fetal movement is present, complains of contractions, denies loss of fluid, denies vaginal bleeding. DATING:  LMP: No LMP recorded. Patient is pregnant.   Estimated Date of Delivery: 3/15/22   Based on: ultrasound, at 11 1/7 weeks GA    PREGNANCY RISK FACTORS:  Patient Active Problem List   Diagnosis    High-risk pregnancy, third trimester    Limited prenatal care in third trimester    Anxiety    37 weeks gestation of pregnancy    Proteinuria    38 weeks gestation of pregnancy    Uterine contractions at greater than 20 weeks of gestation        Steroids Given In This Pregnancy:  no     REVIEW OF SYSTEMS:   Constitutional: negative fever, negative chills, negative weight changes   HEENT: negative visual disturbances, negative headaches, negative dizziness, negative hearing loss  Breast: Negative breast abnormalities, negative breast lumps, negative nipple discharge  Respiratory: negative dyspnea, negative cough, negative SOB  Cardiovascular: negative chest pain,  negative palpitations, negative arrhythmia, negative syncope   Gastrointestinal: negative abdominal pain, negative RUQ pain, negative N/V, negative diarrhea, negative constipation, negative bowel changes, negative heartburn   Genitourinary: negative dysuria, negative hematuria, negative urinary incontinence, negative vaginal discharge, negative vaginal bleeding or spotting, contractions  Dermatological: negative rash, negative pruritis, negative mole or other skin changes  Hematologic: negative bruising  Immunologic/Lymphatic: negative recent illness, negative recent sick contact  Musculoskeletal: negative back pain, negative myalgias, negative arthralgias  Neurological:  negative dizziness, negative migraines, negative seizures, negative weakness  Behavior/Psych: negative depression, negative anxiety, negative SI, negative HI    OBSTETRICAL HISTORY:   OB History    Para Term  AB Living   2 0 0 0 1 0   SAB IAB Ectopic Molar Multiple Live Births   1 0 0 0 0 0      # Outcome Date GA Lbr Jalen/2nd Weight Sex Delivery Anes PTL Lv   2 Current            1 SAB                PAST MEDICAL HISTORY:   has no past medical history on file. PAST SURGICAL HISTORY:   has no past surgical history on file. ALLERGIES:  has No Known Allergies. MEDICATIONS:  Prior to Admission medications    Medication Sig Start Date End Date Taking? Authorizing Provider   acetaminophen (TYLENOL) 500 MG tablet Take 500 mg by mouth every 6 hours as needed for Pain    Historical Provider, MD       FAMILY HISTORY:  family history includes Breast Cancer in her maternal aunt. SOCIAL HISTORY:   reports that she quit smoking about 5 weeks ago. Her smoking use included cigarettes. She smoked 0.25 packs per day. She has never used smokeless tobacco. She reports previous alcohol use. She reports previous drug use.     VITALS:  /78   Pulse 101   Temp 98.6 °F (37 °C) (Oral)   Resp 20       PHYSICAL EXAM:  Fetal Heart Monitor:  Baseline Heart Rate 140, moderate variability, present accelerations, absent decelerations  Roxie: contractions, irregular, every 2-6 minutes    General appearance:  no apparent distress, alert and cooperative  HEENT: head atraumatic, normocephalic, moist mucous membranes, trachea midline  Neurologic:  alert, oriented, normal speech, no focal findings or movement disorder noted  Lungs:  No increased work of breathing, good air exchange, clear to auscultation bilaterally, no crackles or 21  Urine culture: negative, date: 22    Early 1 hour Glucose Tolerance Test: not done  Early 3 hour Glucose Tolerance Test: not done  1 hour Glucose Tolerance Test: not done  3 hour Glucose Tolerance Test: not done    Group B Strep: negative RV culture on 22  Cystic Fibrosis Screen: negative  First Trimester Screen: not done  MSAFP/Multiple Markers: not done  Non-Invasive Prenatal Testing: not done  Anatomy US: anterior placenta, 3VC, female gender, normal anatomy    ASSESSMENT & PLAN:  Donna Aly is a 24 y.o. female  at 36w4d    - GBS negative / Rh positive / R immune   - No indication for GBS prophylaxis   - repeat cervical exam is unchanged   -return precautions given   -pt scheduled for induction 3/7/22    Proteinuria   -pre-eclampsia work up negative, normal blood pressures today  Family history of congenital heart defect   -pt presented too late to care to get fetal ECHO. Normal 4 chamber view on MFM US at 37 weeks    Patient Active Problem List    Diagnosis Date Noted    38 weeks gestation of pregnancy 2022    Uterine contractions at greater than 20 weeks of gestation 2022    37 weeks gestation of pregnancy 2022    Proteinuria 2022     0.32 P/C ratio 22  BP normotensive, PreE labs wnl x1      Anxiety 2022    Limited prenatal care in third trimester 2022     Moved from [place] at 20 weeks (did receive prenatal care there). Dated off 11 week ultrasound. Anatomy scan completed 22 (no report at this time)      High-risk pregnancy, third trimester 2022         Steroids given this admission: No    Risks, benefits, alternatives and possible complications have been discussed in detail with the patient. Admission, and post admission procedures and expectations were discussed in detail. All questions were answered.     Alexandro Shields MD  3/4/2022, 2:12 PM

## 2022-03-07 ENCOUNTER — APPOINTMENT (OUTPATIENT)
Dept: LABOR AND DELIVERY | Age: 22
DRG: 560 | End: 2022-03-07
Payer: MEDICAID

## 2022-03-07 ENCOUNTER — HOSPITAL ENCOUNTER (INPATIENT)
Age: 22
LOS: 2 days | Discharge: HOME | DRG: 560 | End: 2022-03-09
Attending: OBSTETRICS & GYNECOLOGY | Admitting: OBSTETRICS & GYNECOLOGY
Payer: MEDICAID

## 2022-03-07 PROBLEM — Z3A.38 38 WEEKS GESTATION OF PREGNANCY: Status: RESOLVED | Noted: 2022-03-04 | Resolved: 2022-03-07

## 2022-03-07 PROBLEM — Z3A.37 37 WEEKS GESTATION OF PREGNANCY: Status: RESOLVED | Noted: 2022-02-24 | Resolved: 2022-03-07

## 2022-03-07 PROBLEM — Z3A.38 38 WEEKS GESTATION OF PREGNANCY: Status: ACTIVE | Noted: 2022-03-07

## 2022-03-07 PROBLEM — Z82.79 FAMILY HISTORY OF CONGENITAL HEART DEFECT: Status: ACTIVE | Noted: 2022-03-07

## 2022-03-07 PROBLEM — O47.9 UTERINE CONTRACTIONS AT GREATER THAN 20 WEEKS OF GESTATION: Status: RESOLVED | Noted: 2022-03-04 | Resolved: 2022-03-07

## 2022-03-07 PROBLEM — Z83.3 FAMILY HISTORY OF DIABETES MELLITUS TYPE II: Status: ACTIVE | Noted: 2022-03-07

## 2022-03-07 LAB
ABO/RH: NORMAL
ABSOLUTE EOS #: 0.03 K/UL (ref 0–0.44)
ABSOLUTE IMMATURE GRANULOCYTE: 0.08 K/UL (ref 0–0.3)
ABSOLUTE LYMPH #: 1.49 K/UL (ref 1.1–3.7)
ABSOLUTE MONO #: 0.9 K/UL (ref 0.1–1.4)
AMPHETAMINE SCREEN URINE: NEGATIVE
ANTIBODY SCREEN: NEGATIVE
ARM BAND NUMBER: NORMAL
BARBITURATE SCREEN URINE: NEGATIVE
BASOPHILS # BLD: 0 % (ref 0–2)
BASOPHILS ABSOLUTE: <0.03 K/UL (ref 0–0.2)
BENZODIAZEPINE SCREEN, URINE: NEGATIVE
CANNABINOID SCREEN URINE: NEGATIVE
COCAINE METABOLITE, URINE: NEGATIVE
EOSINOPHILS RELATIVE PERCENT: 0 % (ref 1–4)
EXPIRATION DATE: NORMAL
GLUCOSE BLD-MCNC: 116 MG/DL (ref 65–105)
HCT VFR BLD CALC: 29.2 % (ref 36.3–47.1)
HEMOGLOBIN: 9.8 G/DL (ref 11.9–15.1)
IMMATURE GRANULOCYTES: 1 %
LYMPHOCYTES # BLD: 21 % (ref 25–45)
MCH RBC QN AUTO: 29.2 PG (ref 25.2–33.5)
MCHC RBC AUTO-ENTMCNC: 33.6 G/DL (ref 28.4–34.8)
MCV RBC AUTO: 86.9 FL (ref 82.6–102.9)
METHADONE SCREEN, URINE: NEGATIVE
MONOCYTES # BLD: 13 % (ref 2–8)
NRBC AUTOMATED: 0 PER 100 WBC
OPIATES, URINE: NEGATIVE
OXYCODONE SCREEN URINE: NEGATIVE
PDW BLD-RTO: 12.8 % (ref 11.8–14.4)
PHENCYCLIDINE, URINE: NEGATIVE
PLATELET # BLD: 205 K/UL (ref 138–453)
PMV BLD AUTO: 10.7 FL (ref 8.1–13.5)
RBC # BLD: 3.36 M/UL (ref 3.95–5.11)
SARS-COV-2, RAPID: NOT DETECTED
SEG NEUTROPHILS: 65 % (ref 34–64)
SEGMENTED NEUTROPHILS ABSOLUTE COUNT: 4.67 K/UL (ref 1.5–8.1)
SPECIMEN DESCRIPTION: NORMAL
TEST INFORMATION: NORMAL
WBC # BLD: 7.2 K/UL (ref 4.5–13.5)

## 2022-03-07 PROCEDURE — 2580000003 HC RX 258: Performed by: STUDENT IN AN ORGANIZED HEALTH CARE EDUCATION/TRAINING PROGRAM

## 2022-03-07 PROCEDURE — 87635 SARS-COV-2 COVID-19 AMP PRB: CPT

## 2022-03-07 PROCEDURE — 80307 DRUG TEST PRSMV CHEM ANLYZR: CPT

## 2022-03-07 PROCEDURE — 85025 COMPLETE CBC W/AUTO DIFF WBC: CPT

## 2022-03-07 PROCEDURE — 86900 BLOOD TYPING SEROLOGIC ABO: CPT

## 2022-03-07 PROCEDURE — 82947 ASSAY GLUCOSE BLOOD QUANT: CPT

## 2022-03-07 PROCEDURE — 86901 BLOOD TYPING SEROLOGIC RH(D): CPT

## 2022-03-07 PROCEDURE — 1220000000 HC SEMI PRIVATE OB R&B

## 2022-03-07 PROCEDURE — 86780 TREPONEMA PALLIDUM: CPT

## 2022-03-07 PROCEDURE — 86850 RBC ANTIBODY SCREEN: CPT

## 2022-03-07 RX ORDER — SODIUM CHLORIDE, SODIUM LACTATE, POTASSIUM CHLORIDE, CALCIUM CHLORIDE 600; 310; 30; 20 MG/100ML; MG/100ML; MG/100ML; MG/100ML
INJECTION, SOLUTION INTRAVENOUS CONTINUOUS
Status: DISCONTINUED | OUTPATIENT
Start: 2022-03-07 | End: 2022-03-08

## 2022-03-07 RX ORDER — ACETAMINOPHEN 500 MG
1000 TABLET ORAL EVERY 6 HOURS PRN
Status: DISCONTINUED | OUTPATIENT
Start: 2022-03-07 | End: 2022-03-08

## 2022-03-07 RX ORDER — SODIUM CHLORIDE, SODIUM LACTATE, POTASSIUM CHLORIDE, AND CALCIUM CHLORIDE .6; .31; .03; .02 G/100ML; G/100ML; G/100ML; G/100ML
500 INJECTION, SOLUTION INTRAVENOUS PRN
Status: DISCONTINUED | OUTPATIENT
Start: 2022-03-07 | End: 2022-03-08

## 2022-03-07 RX ORDER — LIDOCAINE HYDROCHLORIDE 10 MG/ML
30 INJECTION, SOLUTION EPIDURAL; INFILTRATION; INTRACAUDAL; PERINEURAL PRN
Status: DISCONTINUED | OUTPATIENT
Start: 2022-03-07 | End: 2022-03-08

## 2022-03-07 RX ORDER — SODIUM CHLORIDE 9 MG/ML
25 INJECTION, SOLUTION INTRAVENOUS PRN
Status: DISCONTINUED | OUTPATIENT
Start: 2022-03-07 | End: 2022-03-08

## 2022-03-07 RX ORDER — SODIUM CHLORIDE 0.9 % (FLUSH) 0.9 %
5-40 SYRINGE (ML) INJECTION EVERY 12 HOURS SCHEDULED
Status: DISCONTINUED | OUTPATIENT
Start: 2022-03-07 | End: 2022-03-08

## 2022-03-07 RX ORDER — SODIUM CHLORIDE, SODIUM LACTATE, POTASSIUM CHLORIDE, AND CALCIUM CHLORIDE .6; .31; .03; .02 G/100ML; G/100ML; G/100ML; G/100ML
1000 INJECTION, SOLUTION INTRAVENOUS PRN
Status: DISCONTINUED | OUTPATIENT
Start: 2022-03-07 | End: 2022-03-08

## 2022-03-07 RX ORDER — ONDANSETRON 2 MG/ML
4 INJECTION INTRAMUSCULAR; INTRAVENOUS EVERY 6 HOURS PRN
Status: DISCONTINUED | OUTPATIENT
Start: 2022-03-07 | End: 2022-03-08 | Stop reason: SDUPTHER

## 2022-03-07 RX ORDER — DIPHENHYDRAMINE HCL 25 MG
25 TABLET ORAL EVERY 4 HOURS PRN
Status: DISCONTINUED | OUTPATIENT
Start: 2022-03-07 | End: 2022-03-08

## 2022-03-07 RX ORDER — SODIUM CHLORIDE 0.9 % (FLUSH) 0.9 %
5-40 SYRINGE (ML) INJECTION PRN
Status: DISCONTINUED | OUTPATIENT
Start: 2022-03-07 | End: 2022-03-08

## 2022-03-07 RX ADMIN — SODIUM CHLORIDE, POTASSIUM CHLORIDE, SODIUM LACTATE AND CALCIUM CHLORIDE: 600; 310; 30; 20 INJECTION, SOLUTION INTRAVENOUS at 23:27

## 2022-03-08 ENCOUNTER — ANESTHESIA EVENT (OUTPATIENT)
Dept: LABOR AND DELIVERY | Age: 22
DRG: 560 | End: 2022-03-08
Payer: MEDICAID

## 2022-03-08 ENCOUNTER — ANESTHESIA (OUTPATIENT)
Dept: LABOR AND DELIVERY | Age: 22
DRG: 560 | End: 2022-03-08
Payer: MEDICAID

## 2022-03-08 LAB — T. PALLIDUM, IGG: NONREACTIVE

## 2022-03-08 PROCEDURE — 3700000025 EPIDURAL BLOCK: Performed by: ANESTHESIOLOGY

## 2022-03-08 PROCEDURE — 96376 TX/PRO/DX INJ SAME DRUG ADON: CPT

## 2022-03-08 PROCEDURE — 3E033VJ INTRODUCTION OF OTHER HORMONE INTO PERIPHERAL VEIN, PERCUTANEOUS APPROACH: ICD-10-PCS | Performed by: OBSTETRICS & GYNECOLOGY

## 2022-03-08 PROCEDURE — 51701 INSERT BLADDER CATHETER: CPT

## 2022-03-08 PROCEDURE — 10907ZC DRAINAGE OF AMNIOTIC FLUID, THERAPEUTIC FROM PRODUCTS OF CONCEPTION, VIA NATURAL OR ARTIFICIAL OPENING: ICD-10-PCS | Performed by: OBSTETRICS & GYNECOLOGY

## 2022-03-08 PROCEDURE — 7200000001 HC VAGINAL DELIVERY

## 2022-03-08 PROCEDURE — 6360000002 HC RX W HCPCS: Performed by: STUDENT IN AN ORGANIZED HEALTH CARE EDUCATION/TRAINING PROGRAM

## 2022-03-08 PROCEDURE — 6370000000 HC RX 637 (ALT 250 FOR IP): Performed by: STUDENT IN AN ORGANIZED HEALTH CARE EDUCATION/TRAINING PROGRAM

## 2022-03-08 PROCEDURE — 1220000000 HC SEMI PRIVATE OB R&B

## 2022-03-08 PROCEDURE — 96375 TX/PRO/DX INJ NEW DRUG ADDON: CPT

## 2022-03-08 PROCEDURE — 6360000002 HC RX W HCPCS: Performed by: NURSE ANESTHETIST, CERTIFIED REGISTERED

## 2022-03-08 PROCEDURE — 96372 THER/PROPH/DIAG INJ SC/IM: CPT

## 2022-03-08 PROCEDURE — 2500000003 HC RX 250 WO HCPCS: Performed by: NURSE ANESTHETIST, CERTIFIED REGISTERED

## 2022-03-08 PROCEDURE — 6360000002 HC RX W HCPCS: Performed by: ANESTHESIOLOGY

## 2022-03-08 PROCEDURE — 59409 OBSTETRICAL CARE: CPT | Performed by: OBSTETRICS & GYNECOLOGY

## 2022-03-08 PROCEDURE — 6360000002 HC RX W HCPCS

## 2022-03-08 PROCEDURE — 88307 TISSUE EXAM BY PATHOLOGIST: CPT

## 2022-03-08 PROCEDURE — 2580000003 HC RX 258: Performed by: STUDENT IN AN ORGANIZED HEALTH CARE EDUCATION/TRAINING PROGRAM

## 2022-03-08 RX ORDER — ROPIVACAINE HYDROCHLORIDE 2 MG/ML
INJECTION, SOLUTION EPIDURAL; INFILTRATION; PERINEURAL
Status: COMPLETED
Start: 2022-03-08 | End: 2022-03-08

## 2022-03-08 RX ORDER — MORPHINE SULFATE 2 MG/ML
2 INJECTION, SOLUTION INTRAMUSCULAR; INTRAVENOUS ONCE
Status: COMPLETED | OUTPATIENT
Start: 2022-03-08 | End: 2022-03-08

## 2022-03-08 RX ORDER — KETOROLAC TROMETHAMINE 30 MG/ML
30 INJECTION, SOLUTION INTRAMUSCULAR; INTRAVENOUS ONCE
Status: COMPLETED | OUTPATIENT
Start: 2022-03-08 | End: 2022-03-08

## 2022-03-08 RX ORDER — BISACODYL 10 MG
10 SUPPOSITORY, RECTAL RECTAL DAILY PRN
Status: DISCONTINUED | OUTPATIENT
Start: 2022-03-08 | End: 2022-03-10 | Stop reason: HOSPADM

## 2022-03-08 RX ORDER — LANOLIN 72 %
OINTMENT (GRAM) TOPICAL PRN
Status: DISCONTINUED | OUTPATIENT
Start: 2022-03-08 | End: 2022-03-10 | Stop reason: HOSPADM

## 2022-03-08 RX ORDER — ACETAMINOPHEN 500 MG
1000 TABLET ORAL EVERY 6 HOURS PRN
Status: DISCONTINUED | OUTPATIENT
Start: 2022-03-08 | End: 2022-03-10 | Stop reason: HOSPADM

## 2022-03-08 RX ORDER — MORPHINE SULFATE 10 MG/ML
8 INJECTION, SOLUTION INTRAMUSCULAR; INTRAVENOUS ONCE
Status: COMPLETED | OUTPATIENT
Start: 2022-03-08 | End: 2022-03-08

## 2022-03-08 RX ORDER — SODIUM CHLORIDE, SODIUM LACTATE, POTASSIUM CHLORIDE, CALCIUM CHLORIDE 600; 310; 30; 20 MG/100ML; MG/100ML; MG/100ML; MG/100ML
INJECTION, SOLUTION INTRAVENOUS CONTINUOUS
Status: DISCONTINUED | OUTPATIENT
Start: 2022-03-09 | End: 2022-03-09

## 2022-03-08 RX ORDER — LIDOCAINE HYDROCHLORIDE AND EPINEPHRINE 15; 5 MG/ML; UG/ML
INJECTION, SOLUTION EPIDURAL PRN
Status: DISCONTINUED | OUTPATIENT
Start: 2022-03-08 | End: 2022-03-08 | Stop reason: SDUPTHER

## 2022-03-08 RX ORDER — NALBUPHINE HCL 10 MG/ML
10 AMPUL (ML) INJECTION ONCE
Status: COMPLETED | OUTPATIENT
Start: 2022-03-08 | End: 2022-03-08

## 2022-03-08 RX ORDER — NALBUPHINE HCL 10 MG/ML
5 AMPUL (ML) INJECTION EVERY 4 HOURS PRN
Status: DISCONTINUED | OUTPATIENT
Start: 2022-03-08 | End: 2022-03-08

## 2022-03-08 RX ORDER — LIDOCAINE HYDROCHLORIDE 10 MG/ML
INJECTION, SOLUTION EPIDURAL; INFILTRATION; INTRACAUDAL; PERINEURAL PRN
Status: DISCONTINUED | OUTPATIENT
Start: 2022-03-08 | End: 2022-03-08 | Stop reason: SDUPTHER

## 2022-03-08 RX ORDER — PROMETHAZINE HYDROCHLORIDE 25 MG/ML
25 INJECTION, SOLUTION INTRAMUSCULAR; INTRAVENOUS ONCE
Status: COMPLETED | OUTPATIENT
Start: 2022-03-08 | End: 2022-03-08

## 2022-03-08 RX ORDER — ROPIVACAINE HYDROCHLORIDE 2 MG/ML
INJECTION, SOLUTION EPIDURAL; INFILTRATION; PERINEURAL PRN
Status: DISCONTINUED | OUTPATIENT
Start: 2022-03-08 | End: 2022-03-08 | Stop reason: SDUPTHER

## 2022-03-08 RX ORDER — HYDROCORTISONE 25 MG/G
CREAM TOPICAL
Status: DISCONTINUED | OUTPATIENT
Start: 2022-03-08 | End: 2022-03-10 | Stop reason: HOSPADM

## 2022-03-08 RX ORDER — ONDANSETRON 2 MG/ML
4 INJECTION INTRAMUSCULAR; INTRAVENOUS EVERY 4 HOURS PRN
Status: DISCONTINUED | OUTPATIENT
Start: 2022-03-08 | End: 2022-03-10 | Stop reason: HOSPADM

## 2022-03-08 RX ORDER — IBUPROFEN 800 MG/1
800 TABLET ORAL EVERY 6 HOURS PRN
Status: DISCONTINUED | OUTPATIENT
Start: 2022-03-08 | End: 2022-03-10 | Stop reason: HOSPADM

## 2022-03-08 RX ORDER — SIMETHICONE 80 MG
80 TABLET,CHEWABLE ORAL EVERY 6 HOURS PRN
Status: DISCONTINUED | OUTPATIENT
Start: 2022-03-08 | End: 2022-03-10 | Stop reason: HOSPADM

## 2022-03-08 RX ORDER — NALOXONE HYDROCHLORIDE 0.4 MG/ML
0.4 INJECTION, SOLUTION INTRAMUSCULAR; INTRAVENOUS; SUBCUTANEOUS PRN
Status: DISCONTINUED | OUTPATIENT
Start: 2022-03-08 | End: 2022-03-08

## 2022-03-08 RX ORDER — DOCUSATE SODIUM 100 MG/1
100 CAPSULE, LIQUID FILLED ORAL 2 TIMES DAILY
Status: DISCONTINUED | OUTPATIENT
Start: 2022-03-09 | End: 2022-03-10 | Stop reason: HOSPADM

## 2022-03-08 RX ORDER — ONDANSETRON 2 MG/ML
4 INJECTION INTRAMUSCULAR; INTRAVENOUS EVERY 6 HOURS PRN
Status: DISCONTINUED | OUTPATIENT
Start: 2022-03-08 | End: 2022-03-08

## 2022-03-08 RX ADMIN — Medication 6 ML/HR: at 17:00

## 2022-03-08 RX ADMIN — MORPHINE SULFATE 2 MG: 2 INJECTION, SOLUTION INTRAMUSCULAR; INTRAVENOUS at 08:54

## 2022-03-08 RX ADMIN — SODIUM CHLORIDE, POTASSIUM CHLORIDE, SODIUM LACTATE AND CALCIUM CHLORIDE 1000 ML: 600; 310; 30; 20 INJECTION, SOLUTION INTRAVENOUS at 16:15

## 2022-03-08 RX ADMIN — Medication 166.7 ML: at 20:31

## 2022-03-08 RX ADMIN — PROMETHAZINE HYDROCHLORIDE 25 MG: 25 INJECTION INTRAMUSCULAR; INTRAVENOUS at 09:09

## 2022-03-08 RX ADMIN — LIDOCAINE HYDROCHLORIDE,EPINEPHRINE BITARTRATE 3 ML: 15; .005 INJECTION, SOLUTION EPIDURAL; INFILTRATION; INTRACAUDAL; PERINEURAL at 16:47

## 2022-03-08 RX ADMIN — Medication 25 MCG: at 06:51

## 2022-03-08 RX ADMIN — LIDOCAINE HYDROCHLORIDE,EPINEPHRINE BITARTRATE 2 ML: 15; .005 INJECTION, SOLUTION EPIDURAL; INFILTRATION; INTRACAUDAL; PERINEURAL at 16:48

## 2022-03-08 RX ADMIN — NALBUPHINE HYDROCHLORIDE 10 MG: 10 INJECTION, SOLUTION INTRAMUSCULAR; INTRAVENOUS; SUBCUTANEOUS at 14:58

## 2022-03-08 RX ADMIN — ROPIVACAINE HYDROCHLORIDE 3 ML: 2 INJECTION, SOLUTION EPIDURAL; INFILTRATION at 16:58

## 2022-03-08 RX ADMIN — SODIUM CHLORIDE, POTASSIUM CHLORIDE, SODIUM LACTATE AND CALCIUM CHLORIDE: 600; 310; 30; 20 INJECTION, SOLUTION INTRAVENOUS at 17:11

## 2022-03-08 RX ADMIN — SODIUM CHLORIDE, POTASSIUM CHLORIDE, SODIUM LACTATE AND CALCIUM CHLORIDE 990 ML: 600; 310; 30; 20 INJECTION, SOLUTION INTRAVENOUS at 08:48

## 2022-03-08 RX ADMIN — LIDOCAINE HYDROCHLORIDE 3 ML: 10 INJECTION, SOLUTION EPIDURAL; INFILTRATION; INTRACAUDAL; PERINEURAL at 16:38

## 2022-03-08 RX ADMIN — Medication 87.3 MILLI-UNITS/MIN: at 20:31

## 2022-03-08 RX ADMIN — Medication 25 MCG: at 01:41

## 2022-03-08 RX ADMIN — KETOROLAC TROMETHAMINE 30 MG: 30 INJECTION, SOLUTION INTRAMUSCULAR; INTRAVENOUS at 21:17

## 2022-03-08 RX ADMIN — MORPHINE SULFATE 8 MG: 10 INJECTION INTRAVENOUS at 09:07

## 2022-03-08 RX ADMIN — Medication 1 MILLI-UNITS/MIN: at 12:56

## 2022-03-08 RX ADMIN — ROPIVACAINE HYDROCHLORIDE 2 ML: 2 INJECTION, SOLUTION EPIDURAL; INFILTRATION at 16:55

## 2022-03-08 ASSESSMENT — PAIN SCALES - GENERAL
PAINLEVEL_OUTOF10: 6
PAINLEVEL_OUTOF10: 4
PAINLEVEL_OUTOF10: 0
PAINLEVEL_OUTOF10: 5
PAINLEVEL_OUTOF10: 10

## 2022-03-08 NOTE — PROGRESS NOTES
Labor Progress Note    Dalton Marcano is a 24 y.o. female  at 39w0d  The patient was seen and examined. Her pain is well controlled. She reports fetal movement is present, complains of contractions, denies loss of fluid, denies vaginal bleeding. Vital Signs:  Vitals:    22 2315 22 0141 22 0245 22 0651   BP:  (!) 101/55 105/66 (!) 98/53   Pulse:  90 98 84   Resp:  16 17 16   Temp:    98 °F (36.7 °C)   TempSrc:    Oral   SpO2:       Weight: 137 lb (62.1 kg)      Height: 4' 9\" (1.448 m)          FHT: 135, moderate variability, accelerations present, decelerations absent  Contractions: regular, every 2-3 minutes    Chaperone for Intimate Exam: Chaperone was present for entire exam, Chaperone Name: Ze Sidhu RN  Cervical Exam: 1-2 cm dilated, 70 effaced, -2 station  Pitocin: @ 0 mu/min    Membranes: Intact  Scalp Electrode in place: absent  Intrauterine Pressure Catheter in Place: absent    Interventions: Doherty balloon placed without difficulty.      Assessment/Plan:  Datlon Marcano is a 24 y.o. female  at 39w0d admitted for RR IOL   - GBS negative, No indication for GBS prophylaxis   - VSS, afebrile   - cEFM/TOCO    - Doherty balloon in place   - S/p Cytotec 25 PO x2   - Will order Morphine/phenergan x1 for pain control following doherty      Attending updated and in agreement with plan    Karon Lin DO  Ob/Gyn Resident  3/8/2022, 8:36 AM

## 2022-03-08 NOTE — H&P
depression, negative anxiety    OBSTETRICAL HISTORY:   OB History    Para Term  AB Living   2 0 0 0 1 0   SAB IAB Ectopic Molar Multiple Live Births   1 0 0 0 0 0      # Outcome Date GA Lbr Jalen/2nd Weight Sex Delivery Anes PTL Lv   2 Current            1 SAB                PAST MEDICAL HISTORY:   has no past medical history on file. PAST SURGICAL HISTORY:   has no past surgical history on file. ALLERGIES:  has No Known Allergies. MEDICATIONS:  Prior to Admission medications    Medication Sig Start Date End Date Taking? Authorizing Provider   acetaminophen (TYLENOL) 500 MG tablet Take 500 mg by mouth every 6 hours as needed for Pain    Historical Provider, MD       FAMILY HISTORY:  family history includes Breast Cancer in her maternal aunt. SOCIAL HISTORY:   reports that she quit smoking about 5 weeks ago. Her smoking use included cigarettes. She smoked 0.25 packs per day. She has never used smokeless tobacco. She reports previous alcohol use. She reports previous drug use.     VITALS:  Vitals:    22 2248 22 2315 22 0141 22 0245   BP: 122/68  (!) 101/55 105/66   Pulse: 107  90 98   Resp: 18  16 17   Temp: 97.9 °F (36.6 °C)      TempSrc:       SpO2: 98%      Weight:  137 lb (62.1 kg)     Height:  4' 9\" (1.448 m)           PHYSICAL EXAM:  Fetal Heart Monitor:  Baseline Heart Rate 125, moderate variability, present accelerations, absent decelerations  [place]: uterine irritability    General appearance:  no apparent distress, alert, and cooperative  HEENT: head atraumatic, normocephalic, moist mucous membranes, trachea midline  Neurologic:  alert, oriented, normal speech, no focal findings or movement disorder noted  Lungs:  No increased work of breathing, good air exchange, clear to auscultation bilaterally, no crackles or wheezing  Heart:  regular rate and rhythm and no murmur    Abdomen:  soft, gravid, non-tender, no rebound, guarding, or rigidity, and no RUQ or epigastric tenderness  Extremities:  no calf tenderness, non edematous, DTR's: +2/4 bilateral lower extremities   Musculoskeletal: Gross strength equal and intact throughout, no gross abnormalities, range of motion normal in hips, knees, shoulders and spine, CVA tenderness: none  Psychiatric: Mood appropriate, normal affect   Rectal Exam: not indicated  Sterile Vaginal Exam:  Cervix: No cervical motion tenderness   Uterus: Is gravid, Normal size, shape, consistency and non-tender   Adnexa: Non-tender, no palpable masses  Cervix: 1 cm dilated, 60 % effaced, -2 station, mid position (out of 3 station), medium consistency, FETAL POSITION: Cephalic (confirmed by ultrasound), Membranes intact,    Bishops Score: 5     0 1 2 3   Position Posterior Intermediate Anterior -   Consistency Firm Intermediate Soft -   Effacement 0-30% 31-50% 51-80% >80%   Dilation 0cm 1-2cm 3-4cm >5cm   Fetal Station -3 -2 -1, 0 +1, +2             OMM EXAM:  Chief Complaint: Pregnancy  Reason for No Exam (if applicable): not applicable  Anterior/ Posterior Spinal Curves: Lumbar Lordosis -  Slightly increased  Assessment Tool: T= Tenderness, A= Asymmetry, R= Restricted Motion (A=Active, P=Passive), T=Tissue Texture Changes  Region Evaluated : Severity / Specific of Major Somatic Dysfunction: M99.03 Lumbar -  Minor TART  Major Correlations with: Gravid  Structural Diagnosis: Lumbar lordosis slightly increased 2/2 pregnancy  Treatment Plan: Outpatient       LIMITED BEDSIDE US:  Position: Cephalic  Placental Location: anterior  Fetal Heart Tones: Present  Fetal Movement: Present  Amniotic Fluid Index/Volume: >2x2 cm MVP  Estimated Fetal Weight:  6 lbs 15oz    PRENATAL LAB RESULTS:   Blood Type/Rh: A pos  Antibody Screen: negative  Hemoglobin, Hematocrit, Platelets: 00.7 / 96.6 / 200  Rubella: immune  T.  Pallidum, IgG: non-reactive   Hepatitis B Surface Antigen: non-reactive   Hepatitis C antibody: non-reactive   HIV: non-reactive   Sickle Cell Screen: negative  Gonorrhea: negative  Chlamydia: negative  Urine culture: negative, date: 2022    1 hour Glucose Tolerance Test: ordered, not done  3 hour Glucose Tolerance Test: not done    Group B Strep: negative RV culture on 2022  Cystic Fibrosis Screen: negative  First Trimester Screen: not available  MSAFP/Multiple Markers: not available  Non-Invasive Prenatal Testing: not available  Anatomy US: anterior placenta, 3VC, female gender, normal anatomy     ASSESSMENT & PLAN:  India Fowler is a 24 y.o. female  at 38w6d IUP   - GBS negative / Rh positive / R immune   - No indication for GBS prophylaxis       Risk reducing IOL   - Admit to labor and delivery under the service of Dr. Rachel Kim   - VSS    - cEFM and TOCO   - Cat 1 FHT and TOCO showing uterine irritability   - CBC, T&S, T.Pal, COVID, POCT glucose ordered   - UDS ordered.  R/B/A discussed with patient and patient agreeable   - IVF: LR @ 125mL/hr   - Plan of Induction: Cytotec 25 PO x1, to be given at midnight when patient is 39w0d   - Continue expectant management       Proteinuria   - P/C 0.32 on 3/24 on preeclampsia work up, work up was negative, normotensive blood pressure today      Limited prenatal care    - Moved from [facility] at 20 weeks [place], patient reports receiving care there      Late [place]    - See above      FHx CHD   - FOB   - Fetal anatomy wnl      FHx Type II DM (pt's mother)    - 1hr GTT ordered, not completed   - POCT glucose ordered      Anxiety   - Mood stable on no meds      BMI 29    Patient Active Problem List    Diagnosis Date Noted    38 weeks gestation of pregnancy 2022    Family history of diabetes mellitus type II (pt's mother) 2022    Family history of congenital heart defect (pt's FOB in G2 Preg) 2022    Proteinuria 2022     0.32 P/C ratio 22  BP normotensive, PreE labs wnl x1      Anxiety 2022    Limited prenatal care in third trimester 2022     Moved from [place] at 20 weeks (did receive prenatal care there). Dated off 11 week ultrasound. Anatomy scan completed 2/22/22 (no report at this time)      High-risk pregnancy, third trimester 01/27/2022       Plan discussed with Dr. Chelsi Acosta, who is agreeable. Steroids given this admission: No    Risks, benefits, alternatives and possible complications have been discussed in detail with the patient. Admission, and post admission procedures and expectations were discussed in detail. All questions were answered.     Attending's Name: Dr. Alivia Schwartz DO  Ob/Gyn Resident  3/8/2022, 4:01 AM

## 2022-03-08 NOTE — PROGRESS NOTES
Labor Progress Note    Beto Menchaca is a 24 y.o. female  at 39w0d  The patient was seen and examined. Her pain is well controlled. She reports fetal movement is present, complains of contractions, complains of loss of fluid, complains of vaginal bleeding consistent with bloody show. Vital Signs:  Vitals:    22 1710 22 1711 22 1715 22 1731   BP:  114/78 126/64 125/65   Pulse:  68 63 71   Resp:       Temp:       TempSrc:       SpO2: 99%  100%    Weight:       Height:             FHT: 125, moderate variability, accelerations present, decelerations variable decelerations present occurring with <50% of contractions  Contractions: regular, every 1-3 minutes    Chaperone for Intimate Exam: Chaperone was present for entire exam, Chaperone Name: Reagan Tavarez RN  Cervical Exam: 5 cm dilated, 60 effaced, -1 station  Pitocin: @ 6 mu/min    Membranes: Ruptured clear fluid  Scalp Electrode in place: absent  Intrauterine Pressure Catheter in Place: absent    Assessment/Plan:   Beto Menchaca is a 24 y.o. female  at 39w0d admitted for RR IOL    - GBS negative, No indication for GBS prophylaxis   - SVE: 5, 60, -1   - Pitocin @ 1256. Continue per protocol   - AROM (clr) @ 1415   - Epidural in place    - S/p Hollis balloon   - S/p Cytotec 25 PO x2   - S/p Morphine/Phenergan x1   - S/p Nubain x1   - Continue current management    Attending updated and in agreement with plan.     Krishna Maria DO  Ob/Gyn Resident  3/8/2022, 6:18 PM

## 2022-03-08 NOTE — ANESTHESIA PROCEDURE NOTES
Epidural Block    Patient location during procedure: OB  Reason for block: labor epidural  Staffing  Performed: resident/CRNA   Anesthesiologist: Arline Osgood, MD  Resident/CRNA: TE Dow CRNA  Preanesthetic Checklist  Completed: patient identified, IV checked, site marked, risks and benefits discussed, surgical consent, monitors and equipment checked, pre-op evaluation, timeout performed, anesthesia consent given, oxygen available and patient being monitored  Epidural  Patient position: sitting  Prep: Betadine  Patient monitoring: continuous pulse ox and frequent blood pressure checks  Approach: midline  Location: lumbar (1-5)  Injection technique: DENNIS air and DENNIS saline  Provider prep: mask and sterile gloves  Needle  Needle type: Tuohy   Needle gauge: 17 G  Needle length: 3.5 in  Needle insertion depth: 5.5 cm  Catheter type: side hole  Catheter size: 19 G  Catheter at skin depth: 12 cm  Test dose: negative  Assessment  Hemodynamics: stable  Attempts: 1

## 2022-03-08 NOTE — CARE COORDINATION
ANTEPARTUM NOTE    38 weeks gestation of pregnancy [Z3A.38]    [facility] was admitted to L&D on 3/8/2022 for RR IOl @ 38w6d    OB GYN Provider: [facility]    Will meet with patient after delivery to verify name/address/phone/insurance and discuss discharge planning. Anticipate DC home 2 nights after vaginal delivery or 4 nights after C/S delivery as long as hemodynamically stable.      Signature: Electronically signed by Ok Carter RN on 3/8/22 at 7:09 AM EST

## 2022-03-08 NOTE — PROGRESS NOTES
Labor Progress Note    Mj Cota is a 24 y.o. female  at 39w0d  The patient was seen and examined, resting comfortably in bed. Her pain is well controlled. She reports fetal movement is present, denies contractions, denies loss of fluid, denies vaginal bleeding.        Vital Signs:  Vitals:    22 2248 22 2315 22 0141 22 0245   BP: 122/68  (!) 101/55 105/66   Pulse: 107  90 98   Resp: 18  16 17   Temp: 97.9 °F (36.6 °C)      TempSrc:       SpO2: 98%      Weight:  137 lb (62.1 kg)     Height:  4' 9\" (1.448 m)           FHT: 125, moderate variability, accelerations present, decelerations absent  Contractions: irregular, every 4 minutes      Cervical Exam: deferred  Pitocin: @ 0 mu/min    Membranes: Intact  Scalp Electrode in place: absent  Intrauterine Pressure Catheter in Place: absent    Interventions: none    Assessment/Plan:  Mj Cota is a 24 y.o. female  at 39w0d admitted for RR IOL   - GBS negative, No indication for GBS prophylaxis   - cEFM/TOCO   - Cytotec 25 PO x1, next dose ordered   - Continue to monitor closely        Senior resident updated and in agreement with plan    Jesu Corey DO  Ob/Gyn Resident  3/8/2022, 6:14 AM

## 2022-03-08 NOTE — FLOWSHEET NOTE
Pt here for scheduled induction. Pt denies any leaking, bleeding, or ctx. Pt states she has been feeling baby move.

## 2022-03-08 NOTE — PROGRESS NOTES
Labor Progress Note    Angélica Black is a 24 y.o. female  at 39w0d  The patient was seen and examined. Her pain is well controlled. She reports fetal movement is present, denies contractions, denies loss of fluid, denies vaginal bleeding.        Vital Signs:  Vitals:    22 0841 22 1154 22 1255 22 1402   BP: 113/87 (!) 116/56 122/81 110/60   Pulse: 105 78 90 89   Resp: 18 20     Temp: 97.9 °F (36.6 °C)      TempSrc: Oral Oral     SpO2:       Weight:       Height:         FHT: 130, moderate variability, accelerations present, decelerations absent  Contractions: irregular, not tracing well    Chaperone for Intimate Exam: Chaperone was present for entire exam, Chaperone Name: Naz Shelley RN  Cervical Exam: 5 cm dilated, 60 effaced, -1 station  Pitocin: @ 2 mu/min    Membranes: Ruptured clear fluid  Scalp Electrode in place: absent  Intrauterine Pressure Catheter in Place: absent    Interventions: SVE, AROM performed without difficulty    Assessment/Plan:  Angélica Black is a 24 y.o. female  at 39w0d admitted for RR IOL   - GBS negative, No indication for GBS prophylaxis   - VSS, afebrile   - cEFM/TOCO   - S/p doherty    - Cytotec 25 PO x2   - AROM (clr) @ 1415   - Continue pitocin per protocol   - S/p morphine/phenergan x1   - Continue to monitor closely     Attending updated and in agreement with plan    Taurus Geiger MD  Ob/Gyn Resident  3/8/2022, 2:28 PM

## 2022-03-08 NOTE — DISCHARGE SUMMARY
Obstetric Discharge Summary  [facility]    Patient Name: Imelda Cullen  Patient : 2000  Primary Care Physician: Marcus Colón MD  Admit Date: 3/7/2022    Principal Diagnosis: IUP at 38w6d, admitted for RR IOL     Her pregnancy has been complicated by:   Patient Active Problem List   Diagnosis    High-risk pregnancy, third trimester    Limited prenatal care in third trimester    Anxiety    Family history of diabetes mellitus type II (pt's mother)    Family history of congenital heart defect (pt's FOB in G2 Preg)     3/8/22 F Apg 8/9 wt 6#11    PreE w/o SF (G2)       Infection Present?: No  Hospital Acquired: n/a    Surgical Operations & Procedures:  Analgesia: epidural  Delivery Type: Spontaneous Vaginal Delivery: See Labor and Delivery Summary   Laceration(s): Absent    Consultations:  Anesthesia    Pertinent Findings & Procedures:   Imelda Cullen is a 24 y.o. female  at 38w7d admitted for RR IOL; received cytotec 25 PO x2, Morphine/Phenergan x1, Hollis Balloon, Pitocin,  AROM (clr), nubain x1, Epidural.     She delivered by spontaneous vaginal a Live Born infant on 3/8/22. Information for the patient's :  Myra Niño [[id number]]   female   Birth Weight: 6 lb 11.9 oz (3.06 kg)       Apgars: 8 at 1 minute and 9 at 5 minutes.      Postpartum course:   Patient met criteria for preeclampsia without severe features, preE labs wnl, P/C 0.32    Course of patient: complicated by preeclampsia without severe features    Discharge to: Home    Readmission planned: no     Recommendations on Discharge:     Medications:      Medication List      START taking these medications    docusate sodium 100 MG capsule  Commonly known as: Colace  Take 1 capsule by mouth 2 times daily as needed for Constipation     ferrous sulfate 325 (65 Fe) MG tablet  Commonly known as: IRON 325  Take 1 tablet by mouth 2 times daily     ibuprofen 800 MG tablet  Commonly known as: ADVIL;MOTRIN  Take 1 tablet by mouth every 8 hours as needed for Pain        CONTINUE taking these medications    acetaminophen 500 MG tablet  Commonly known as: TYLENOL           Where to Get Your Medications      These medications were sent to [facility] [address], [address]  [address], [facility] [address]    Phone: [phone]   · docusate sodium 100 MG capsule  · ferrous sulfate 325 (65 Fe) MG tablet  · ibuprofen 800 MG tablet           Activity: pelvic rest x 6 weeks, no driving while narcotics, no lifting greater than 15 lbs  Diet: regular diet  Follow up: 1 week BP check    Condition on discharge: stable    Discharge date: 3/9/22    Kina Arias DO  Ob/Gyn Resident    Comments:  Home care and follow-up care were reviewed. Pelvic rest, and birth control were reviewed. Signs and symptoms of mastitis and post partum depression were reviewed. The patient is to notify her physician if any of these occur. The patient was counseled on secondary smoke risks and the increased risk of sudden infant death syndrome and respiratory problems to her baby with exposure. She was counseled on various alternate recommendations to decrease the exposure to secondary smoke to her children.

## 2022-03-08 NOTE — ANESTHESIA PRE PROCEDURE
Department of Anesthesiology  Preprocedure Note       Name:  Mj Cota   Age:  24 y.o.  :  2000                                          MRN:  [id number]         Date:  3/8/2022      Surgeon: * No surgeons listed *    Procedure: * No procedures listed *    Medications prior to admission:   Prior to Admission medications    Medication Sig Start Date End Date Taking?  Authorizing Provider   acetaminophen (TYLENOL) 500 MG tablet Take 500 mg by mouth every 6 hours as needed for Pain    Historical Provider, MD       Current medications:    Current Facility-Administered Medications   Medication Dose Route Frequency Provider Last Rate Last Admin    oxytocin (PITOCIN) 30 units in 500 mL infusion  1-20 [place]-units/min IntraVENous Continuous Charissa Balk, DO 6 mL/hr at 22 1510 6 [place]-units/min at 22 1510    ropivacaine (NAROPIN) 0.2% injection 0.2%             naloxone (NARCAN) injection 0.4 mg  0.4 mg IntraVENous PRN Madeline Moscoso MD        nalbuphine (NUBAIN) injection 5 mg  5 mg IntraVENous Q4H PRN Madeline Moscoso MD        on[facility]etron [facility]) injection 4 mg  4 mg IntraVENous Q6H PRN Madeline Moscoso MD        ropivacaine 0.2% in sodium chloride 0.9% (OB) epidural 100 mL  6 mL/hr Epidural Continuous Madeline Moscoso MD        lactated ringers infusion   IntraVENous Continuous Lisa Hilary,  mL/hr at 22 0848 990 mL at 22 0848    lactated ringers bolus  500 mL IntraVENous PRN Lisa Hilary, DO        Or    lactated ringers bolus  1,000 mL IntraVENous PRN Lorry Day, DO        sodium chloride flush 0.9 % injection 5-40 mL  5-40 mL IntraVENous 2 times per day Lorry Day, DO        sodium chloride flush 0.9 % injection 5-40 mL  5-40 mL IntraVENous PRN Lisa Hilary, DO        0.9 % sodium chloride infusion  25 mL IntraVENous PRN Lisa Hilary, DO        lidocaine PF 1 % injection 30 mL  30 mL Other PRN Lisa Hilary, DO        ondansetron (ZOFRAN) injection 4 mg  4 mg IntraVENous Q6H PRN Marlyne Stewart, DO        diphenhydrAMINE (BENADRYL) tablet 25 mg  25 mg Oral Q4H PRN Lisa Hilary, DO        oxytocin (PITOCIN) 30 units in 500 mL infusion  87.3 [place]-units/min IntraVENous Continuous PRN Lisa Hilary, DO        And    oxytocin (PITOCIN) 10 unit bolus from the bag  10 Units IntraVENous PRN Marlyne Stewart, DO        acetaminophen (TYLENOL) tablet 1,000 mg  1,000 mg Oral Q6H PRN Lisa Hilary, DO           Allergies:  No Known Allergies    Problem List:    Patient Active Problem List   Diagnosis Code    High-risk pregnancy, third trimester O09.93    Limited prenatal care in third trimester O09.33    Anxiety F41.9    Proteinuria R80.9    38 weeks gestation of pregnancy Z3A.38    Family history of diabetes mellitus type II (pt's mother) Z80.1    Family history of congenital heart defect (pt's FOB in G2 Preg) Z82.79       Past Medical History:  History reviewed. No pertinent past medical history. Past Surgical History:  History reviewed. No pertinent surgical history.     Social History:    Social History     Tobacco Use    Smoking status: Former Smoker     Packs/day: 0.25     Types: Cigarettes     Quit date: 2022     Years since quittin.1    Smokeless tobacco: Never Used    Tobacco comment: was told in [place] that quitting could cause damage t o baby   Substance Use Topics    Alcohol use: Not Currently                                Counseling given: Not Answered  Comment: was told in [place] that quitting could cause damage t o baby      Vital Signs (Current):   Vitals:    22 1255 22 1402 22 1510 22 1549   BP: 122/81 110/60 131/87 125/78   Pulse: 90 89 80 87   Resp:      Temp:    37 °C (98.6 °F)   TempSrc:    Oral   SpO2:       Weight:       Height:                                                  BP Readings from Last 3 Encounters:   22 125/78   22 134/78   22 127/80       NPO Status: BMI:   Wt Readings from Last 3 Encounters:   03/07/22 137 lb (62.1 kg)   03/02/22 137 lb (62.1 kg)   02/22/22 133 lb (60.3 kg)     Body mass index is 29.65 kg/m². CBC:   Lab Results   Component Value Date    WBC 7.2 03/07/2022    RBC 3.36 03/07/2022    HGB 9.8 03/07/2022    HCT 29.2 03/07/2022    MCV 86.9 03/07/2022    RDW 12.8 03/07/2022     03/07/2022       CMP:   Lab Results   Component Value Date     02/24/2022    K 3.9 02/24/2022     02/24/2022    CO2 20 02/24/2022    BUN 8 02/24/2022    CREATININE 0.39 02/24/2022    GFRAA >60 02/24/2022    LABGLOM >60 02/24/2022    GLUCOSE 90 02/24/2022    PROT 6.8 02/24/2022    CALCIUM 8.8 02/24/2022    BILITOT 0.16 02/24/2022    ALKPHOS 150 02/24/2022    AST 23 02/24/2022    ALT 12 02/24/2022       POC Tests:   Recent Labs     03/07/22  2329   POCGLU 116*       Coags: No results found for: PROTIME, INR, APTT    HCG (If Applicable): No results found for: PREGTESTUR, PREGSERUM, HCG, HCGQUANT     ABGs: No results found for: PHART, PO2ART, FJN8AYZ, MUD1TUD, BEART, Q3OBEMXX     Type & Screen (If Applicable):  No results found for: LABABO, LABRH    Drug/Infectious Status (If Applicable):  Lab Results   Component Value Date    HEPCAB NONREACTIVE 01/26/2022       COVID-19 Screening (If Applicable):   Lab Results   Component Value Date    COVID19 Not Detected 03/07/2022           Anesthesia Evaluation  Patient summary reviewed and Nursing notes reviewed no history of anesthetic complications:   Airway: Mallampati: II  TM distance: >3 FB   Neck ROM: full  Mouth opening: > = 3 FB Dental:          Pulmonary:                              Cardiovascular:                      Neuro/Psych:               GI/Hepatic/Renal:             Endo/Other:                     Abdominal:             Vascular:           Other Findings:             Anesthesia Plan      epidural     ASA 2     (Plts 206)        Anesthetic plan and risks discussed with patient.                       TE Anderson - CRNA   3/8/2022

## 2022-03-09 VITALS
WEIGHT: 137 LBS | TEMPERATURE: 98 F | SYSTOLIC BLOOD PRESSURE: 107 MMHG | RESPIRATION RATE: 16 BRPM | BODY MASS INDEX: 29.56 KG/M2 | HEART RATE: 88 BPM | HEIGHT: 57 IN | DIASTOLIC BLOOD PRESSURE: 63 MMHG | OXYGEN SATURATION: 98 %

## 2022-03-09 PROBLEM — Z3A.38 38 WEEKS GESTATION OF PREGNANCY: Status: RESOLVED | Noted: 2022-03-07 | Resolved: 2022-03-09

## 2022-03-09 PROBLEM — O14.90 PREECLAMPSIA: Status: ACTIVE | Noted: 2022-03-09

## 2022-03-09 LAB
ABSOLUTE EOS #: <0.03 K/UL (ref 0–0.44)
ABSOLUTE IMMATURE GRANULOCYTE: 0.11 K/UL (ref 0–0.3)
ABSOLUTE LYMPH #: 1.29 K/UL (ref 1.1–3.7)
ABSOLUTE MONO #: 1.21 K/UL (ref 0.1–1.4)
ALBUMIN SERPL-MCNC: 2.6 G/DL (ref 3.5–5.2)
ALBUMIN/GLOBULIN RATIO: 1.2 (ref 1–2.5)
ALP BLD-CCNC: 126 U/L (ref 35–104)
ALT SERPL-CCNC: 9 U/L (ref 5–33)
ANION GAP SERPL CALCULATED.3IONS-SCNC: 11 MMOL/L (ref 9–17)
AST SERPL-CCNC: 25 U/L
BASOPHILS # BLD: 0 % (ref 0–2)
BASOPHILS ABSOLUTE: <0.03 K/UL (ref 0–0.2)
BILIRUB SERPL-MCNC: 0.15 MG/DL (ref 0.3–1.2)
BUN BLDV-MCNC: 7 MG/DL (ref 6–20)
CALCIUM SERPL-MCNC: 8.4 MG/DL (ref 8.6–10.4)
CHLORIDE BLD-SCNC: 102 MMOL/L (ref 98–107)
CO2: 19 MMOL/L (ref 20–31)
CREAT SERPL-MCNC: 0.43 MG/DL (ref 0.5–0.9)
EOSINOPHILS RELATIVE PERCENT: 0 % (ref 1–4)
GFR AFRICAN AMERICAN: >60 ML/MIN
GFR NON-AFRICAN AMERICAN: >60 ML/MIN
GFR SERPL CREATININE-BSD FRML MDRD: ABNORMAL ML/MIN/{1.73_M2}
GLUCOSE BLD-MCNC: 114 MG/DL (ref 70–99)
HCT VFR BLD CALC: 28.4 % (ref 36.3–47.1)
HEMOGLOBIN: 9 G/DL (ref 11.9–15.1)
IMMATURE GRANULOCYTES: 1 %
LYMPHOCYTES # BLD: 9 % (ref 25–45)
MCH RBC QN AUTO: 28.9 PG (ref 25.2–33.5)
MCHC RBC AUTO-ENTMCNC: 31.7 G/DL (ref 28.4–34.8)
MCV RBC AUTO: 91.3 FL (ref 82.6–102.9)
MONOCYTES # BLD: 8 % (ref 2–8)
NRBC AUTOMATED: 0 PER 100 WBC
PDW BLD-RTO: 12.9 % (ref 11.8–14.4)
PLATELET # BLD: 186 K/UL (ref 138–453)
PMV BLD AUTO: 10.7 FL (ref 8.1–13.5)
POTASSIUM SERPL-SCNC: 3.8 MMOL/L (ref 3.7–5.3)
RBC # BLD: 3.11 M/UL (ref 3.95–5.11)
SEG NEUTROPHILS: 82 % (ref 34–64)
SEGMENTED NEUTROPHILS ABSOLUTE COUNT: 11.69 K/UL (ref 1.5–8.1)
SODIUM BLD-SCNC: 132 MMOL/L (ref 135–144)
TOTAL PROTEIN: 4.8 G/DL (ref 6.4–8.3)
WBC # BLD: 14.3 K/UL (ref 4.5–13.5)

## 2022-03-09 PROCEDURE — 85025 COMPLETE CBC W/AUTO DIFF WBC: CPT

## 2022-03-09 PROCEDURE — 80053 COMPREHEN METABOLIC PANEL: CPT

## 2022-03-09 PROCEDURE — 36415 COLL VENOUS BLD VENIPUNCTURE: CPT

## 2022-03-09 PROCEDURE — 99231 SBSQ HOSP IP/OBS SF/LOW 25: CPT | Performed by: OBSTETRICS & GYNECOLOGY

## 2022-03-09 PROCEDURE — 6370000000 HC RX 637 (ALT 250 FOR IP): Performed by: STUDENT IN AN ORGANIZED HEALTH CARE EDUCATION/TRAINING PROGRAM

## 2022-03-09 RX ORDER — IBUPROFEN 800 MG/1
800 TABLET ORAL EVERY 8 HOURS PRN
Qty: 60 TABLET | Refills: 1 | Status: SHIPPED | OUTPATIENT
Start: 2022-03-09

## 2022-03-09 RX ORDER — DOCUSATE SODIUM 100 MG/1
100 CAPSULE, LIQUID FILLED ORAL 2 TIMES DAILY PRN
Qty: 60 CAPSULE | Refills: 0 | Status: SHIPPED | OUTPATIENT
Start: 2022-03-09

## 2022-03-09 RX ORDER — FERROUS SULFATE 325(65) MG
325 TABLET ORAL 2 TIMES DAILY
Qty: 60 TABLET | Refills: 0 | Status: SHIPPED | OUTPATIENT
Start: 2022-03-09

## 2022-03-09 RX ADMIN — IBUPROFEN 800 MG: 600 TABLET, FILM COATED ORAL at 09:44

## 2022-03-09 RX ADMIN — ACETAMINOPHEN 1000 MG: 500 TABLET ORAL at 09:44

## 2022-03-09 RX ADMIN — DOCUSATE SODIUM 100 MG: 100 CAPSULE ORAL at 08:04

## 2022-03-09 ASSESSMENT — PAIN SCALES - GENERAL: PAINLEVEL_OUTOF10: 5

## 2022-03-09 NOTE — PLAN OF CARE
Problem: Anxiety:  Goal: Level of anxiety will decrease  Description: Level of anxiety will decrease  3/9/2022 1758 by Patel Gee RN  Outcome: Ongoing     Problem: Breathing Pattern - Ineffective:  Goal: Able to breathe comfortably  Description: Able to breathe comfortably  3/9/2022 1758 by Patel Gee RN  Outcome: Ongoing     Problem: Fluid Volume - Imbalance:  Goal: Absence of imbalanced fluid volume signs and symptoms  Description: Absence of imbalanced fluid volume signs and symptoms  3/9/2022 1758 by Patel Gee RN  Outcome: Ongoing  Problem: Fluid Volume - Imbalance:  Goal: Absence of postpartum hemorrhage signs and symptoms  Description: Absence of postpartum hemorrhage signs and symptoms  3/9/2022 1758 by Patel Gee RN  Outcome: Ongoing  Problem: Pain - Acute:  Goal: Pain level will decrease  Description: Pain level will decrease  3/9/2022 1758 by Patel Gee RN  Outcome: Ongoing     Problem: Pain - Acute:  Goal: Able to cope with pain  Description: Able to cope with pain  3/9/2022 1758 by Patel Gee RN  Outcome: Ongoing     Problem: Urinary Retention:  Goal: Experiences of bladder distention will decrease  Description: Experiences of bladder distention will decrease  3/9/2022 1758 by Patel Gee RN  Outcome: Ongoing     Problem: Urinary Retention:  Goal: Urinary elimination within specified parameters  Description: Urinary elimination within specified parameters  3/9/2022 1758 by Patel Gee RN  Outcome: Ongoing     Problem: Pain:  Goal: Pain level will decrease  Description: Pain level will decrease  3/9/2022 1758 by Patel Gee RN  Outcome: Ongoing     Problem: Pain:  Goal: Control of acute pain  Description: Control of acute pain  3/9/2022 1758 by Patel Gee RN  Outcome: Ongoing     Problem: Pain:  Goal: Control of chronic pain  Description: Control of chronic pain  3/9/2022 1758 by Patel Gee RN  Outcome: Ongoing     Problem: Discharge Planning:  Goal: Discharged to appropriate level of care  Description: Discharged to appropriate level of care  3/9/2022 1758 by Geo Rawls RN  Outcome: Ongoing     Problem: Constipation:  Goal: Bowel elimination is within specified parameters  Description: Bowel elimination is within specified parameters  3/9/2022 1758 by Geo Rawls RN  Outcome: Ongoing     Problem: Mood - Altered:  Goal: Mood stable  Description: Mood stable  3/9/2022 1758 by Geo Rawls RN  Outcome: Ongoing           Problem: Fluid Volume - Imbalance:  Goal: Absence of postpartum hemorrhage signs and symptoms  Description: Absence of postpartum hemorrhage signs and symptoms  3/9/2022 1758 by Geo Rawls RN  Outcome: Ongoing

## 2022-03-09 NOTE — PROGRESS NOTES
POST PARTUM DAY # 1    Germaine Machuca is a 24 y.o. female  This patient was seen & examined today.  on 3/8/22    Her pregnancy was complicated by:   Patient Active Problem List   Diagnosis    High-risk pregnancy, third trimester    Limited prenatal care in third trimester    Anxiety    Proteinuria    38 weeks gestation of pregnancy    Family history of diabetes mellitus type II (pt's mother)    Family history of congenital heart defect (pt's FOB in G2 Preg)     3/8/22 F Apg  wt 6#11       Today she is doing well without any chief complaint. Her lochia is light. She denies chest pain, shortness of breath, headache, lightheadedness and blurred vision. She is bottle feeding and she denies any breast tenderness. She is ambulating well. Her voiding pattern is normal. I reviewed signs and symptoms of post partum depression with the patient, she currently denies any of these symptoms. She is tolerating solids. Vital Signs:  Vitals:    22 2212 22 2228 22 2245 22 0300   BP: (!) 106/57 108/69 (!) 108/59 108/64   Pulse: 81 93 80 85   Resp: 16 17 16 16   Temp:   98.8 °F (37.1 °C) 98.1 °F (36.7 °C)   TempSrc:   Oral Oral   SpO2:       Weight:       Height:             Physical Exam:  General:  no apparent distress, alert and cooperative  Neurologic:  alert, oriented, normal speech, no focal findings or movement disorder noted  Lungs:  No increased work of breathing, good air exchange, clear to auscultation bilaterally, no crackles or wheezing  Heart:  Normal apical impulse, regular rate and rhythm, normal S1 and S2, no S3 or S4, and no murmur noted    Abdomen: abdomen soft, non-distended, non-tender  Fundus: non-tender, firm, below umbilicus  Extremities:  no calf tenderness, non edematous    Lab:  Lab Results   Component Value Date    HGB 9.8 (L) 2022     Lab Results   Component Value Date    HCT 29.2 (L) 2022       Assessment/Plan:  1.  Germaine Machuca is a [id number] PPD # 1 s/p    - Doing well, VSS   - female infant in [address]   - Encourage ambulation  2. Rh positive/Rubella immune  3. Bottle feeding  4. Preeclampsia without severe features  -Blood pressure stable overnight  -Preeclampsia labs ordered for this morning, P/C 0.32 ()  -Patient denies signs or symptoms of preeclampsia  -Continue to monitor closely  5. Anemia   -Hemoglobin 9.8 on admission   -Clinically asymptomatic   -P.o. iron on discharge  6. Anxiety  -Mood stable no meds  7. BMI 29  8. Continue post partum care    Counseling Completed:  Secondary Smoke risks and Sudden Infant Death Syndrome were reviewed with recommendations. Infant sleeping, \"back to sleep\" and avoidance of co-sleeping recommendations were reviewed. Signs and Symptoms of Post Partum Depression were reviewed. The patient is to call if any occur. Signs and symptoms of Mastitis were reviewed. The patient is to call if any occur for follow up. Discharge instructions including pelvic rest, no driving with pain medicine and office follow-up were reviewed with patient     Attending Physician: Dr. Marleni Gar, DO  Ob/Gyn Resident   3/9/2022, 3:30 AM         Attending Physician Statement  I have discussed the care of Germaine Machuca, including pertinent history and exam findings,  with the resident. I have reviewed the key elements of all parts of the encounter with the resident. I agree with the assessment, plan and orders as documented by the resident.   (GE Modifier)    Electronically signed by Leonela Evans MD at 1:26 PM 22

## 2022-03-09 NOTE — CARE COORDINATION
Social Work     Sw reviewed medical record (current active problem list) and tox screens and found no concerns. Sw spoke with mom and dad briefly to explain Sw role, inquire if any needs or concerns, and provide safe sleep education and discuss. Mom denied any needs or questions and informs baby has a safe sleep environment (pratibha and lui). Mom denied any current s/s of anxiety or depression and is aware to reach out to OB if any s/s occur after dc. Mom did state she has ongoing anxiety that she manages with personal coping skills. Mom states she has been linked with a therapist in the past, but none currently or since moving to [place]. Sw provided a [place] [address] resource list to mom and encouraged her to reach out to a [place] [address] provider. Mom reports a good support system and denied any current questions or needs. Parents report they reside with fob's parents and that this is their first child. Mom reports that ped will be [facility]. Sw encouraged mom to reach out if any issues or concerns arise.

## 2022-03-09 NOTE — PROGRESS NOTES
CLINICAL PHARMACY NOTE: MEDS TO BEDS    Total # of Prescriptions Filled: 3   The following medications were delivered to the patient:  · ferosul 325 mg tab  · Docusate sodium 100 mg cap  · Ibuprofen 800 mg tab    Additional Documentation:Medications delivered to the patient in room 748 @ 2:28pm .

## 2022-03-09 NOTE — FLOWSHEET NOTE
Patient admitted to room 748 from L&D via wheelchair. Oriented to room and surroundings. Plan of care reviewed. Verbalized understanding. Instructed on infant security and safe sleep practices. Preventing falls education provided . The following handouts given: A New Beginning: Your Guide to Postpartum Care, Rounding, gs Security System,Babies Cry A lot, Safe Sleep, Security and Visitation Guidelines. Call light placed within reach.

## 2022-03-09 NOTE — PLAN OF CARE
Problem: Anxiety:  Goal: Level of anxiety will decrease  Description: Level of anxiety will decrease  Outcome: Ongoing     Problem: Breathing Pattern - Ineffective:  Goal: Able to breathe comfortably  Description: Able to breathe comfortably  Outcome: Ongoing     Problem: Fluid Volume - Imbalance:  Goal: Absence of imbalanced fluid volume signs and symptoms  Description: Absence of imbalanced fluid volume signs and symptoms  Outcome: Ongoing  Goal: Absence of intrapartum hemorrhage signs and symptoms  Description: Absence of intrapartum hemorrhage signs and symptoms  Outcome: Ongoing  Goal: Absence of postpartum hemorrhage signs and symptoms  Description: Absence of postpartum hemorrhage signs and symptoms  Outcome: Ongoing     Problem: Infection - Intrapartum Infection:  Goal: Will show no infection signs and symptoms  Description: Will show no infection signs and symptoms  Outcome: Ongoing     Problem: Labor Process - Prolonged:  Goal: Labor progression, first stage, within specified pattern  Description: Labor progression, first stage, within specified pattern  Outcome: Ongoing  Goal: Labor progession, second stage, within specified pattern  Description: Labor progession, second stage, within specified pattern  Outcome: Ongoing  Goal: Uterine contractions within specified parameters  Description: Uterine contractions within specified parameters  Outcome: Ongoing     Problem:  Screening:  Goal: Ability to make informed decisions regarding treatment has improved  Description: Ability to make informed decisions regarding treatment has improved  Outcome: Ongoing     Problem: Pain - Acute:  Goal: Pain level will decrease  Description: Pain level will decrease  Outcome: Ongoing  Goal: Able to cope with pain  Description: Able to cope with pain  Outcome: Ongoing     Problem: Tissue Perfusion - Uteroplacental, Altered:  Description: [TRUNCATED] For intrapartum patients with recurrent variable decelerations of the fetal heart rate, consider transcervical amnioinfusion. For patients in labor, avoid prophylactic use of continuous maternal oxygen supplementation to prevent nonreassu . .. Goal: Absence of abnormal fetal heart rate pattern  Description: Absence of abnormal fetal heart rate pattern  Outcome: Ongoing     Problem: Urinary Retention:  Goal: Experiences of bladder distention will decrease  Description: Experiences of bladder distention will decrease  Outcome: Ongoing  Goal: Urinary elimination within specified parameters  Description: Urinary elimination within specified parameters  Outcome: Ongoing     Problem: Pain:  Description: Pain management should include both nonpharmacologic and pharmacologic interventions.   Goal: Pain level will decrease  Description: Pain level will decrease  Outcome: Ongoing  Goal: Control of acute pain  Description: Control of acute pain  Outcome: Ongoing  Goal: Control of chronic pain  Description: Control of chronic pain  Outcome: Ongoing     Problem: Discharge Planning:  Goal: Discharged to appropriate level of care  Description: Discharged to appropriate level of care  Outcome: Ongoing     Problem: Constipation:  Goal: Bowel elimination is within specified parameters  Description: Bowel elimination is within specified parameters  Outcome: Ongoing     Problem: Mood - Altered:  Goal: Mood stable  Description: Mood stable  Outcome: Ongoing

## 2022-03-09 NOTE — CARE COORDINATION
CASE MANAGEMENT POST-PARTUM TRANSITIONAL CARE PLAN    38 weeks gestation of pregnancy [Z3A.38]    OB Provider: Mary Anne Garcia met w/  at bedside to discuss DCP. She is S/P  on 3/8/2022    Writer verified name/address/phone number correct on facesheet. She states she lives with FOB Fransico Goddard and his parents and siblings.  verbalized no problems with transportation to and from doctors appointments or with paying for medications upon discharge home. [employer] correct. Writer notified mom she has 30 days from date of birth to add  to insurance policy.  verbalized understanding. [facility] confirmed a safe place for infant to sleep at home. Parents verbalized they have a crib, bassinet, and a pack n play    Infant name on BC: Meena Pina. Infant PCP [facility].      DME: no  HOME CARE: no    Barriers to DC: none , anticipate DC of couplet 3/10/2022    Readmission Risk              Risk of Unplanned Readmission:  5

## 2022-03-09 NOTE — PROGRESS NOTES
Attending Physician Statement  I have discussed the care of Samantha Tavares, including pertinent history and exam findings,  with the resident. I have reviewed the key elements of all parts of the encounter with the resident. I agree with the assessment, plan and orders as documented by the resident.   (GE Modifier)    Rocky Reason, DO

## 2022-03-10 LAB — SURGICAL PATHOLOGY REPORT: NORMAL
